# Patient Record
Sex: MALE | Race: WHITE | NOT HISPANIC OR LATINO | Employment: OTHER | URBAN - METROPOLITAN AREA
[De-identification: names, ages, dates, MRNs, and addresses within clinical notes are randomized per-mention and may not be internally consistent; named-entity substitution may affect disease eponyms.]

---

## 2020-10-14 ENCOUNTER — OFFICE VISIT (OUTPATIENT)
Dept: FAMILY MEDICINE CLINIC | Facility: CLINIC | Age: 75
End: 2020-10-14
Payer: MEDICARE

## 2020-10-14 VITALS
HEIGHT: 71 IN | TEMPERATURE: 97.8 F | OXYGEN SATURATION: 97 % | RESPIRATION RATE: 18 BRPM | SYSTOLIC BLOOD PRESSURE: 138 MMHG | BODY MASS INDEX: 32.2 KG/M2 | DIASTOLIC BLOOD PRESSURE: 80 MMHG | HEART RATE: 68 BPM | WEIGHT: 230 LBS

## 2020-10-14 DIAGNOSIS — R20.2 NUMBNESS AND TINGLING OF BOTH LEGS: ICD-10-CM

## 2020-10-14 DIAGNOSIS — R20.0 NUMBNESS AND TINGLING OF BOTH LEGS: ICD-10-CM

## 2020-10-14 DIAGNOSIS — IMO0002 DM (DIABETES MELLITUS) TYPE I UNCONTROLLED, PERIPH VASCULAR DISORDER: ICD-10-CM

## 2020-10-14 DIAGNOSIS — E78.2 HYPERLIPIDEMIA, MIXED: Primary | ICD-10-CM

## 2020-10-14 DIAGNOSIS — I25.10 CORONARY ATHEROSCLEROSIS DUE TO LIPID RICH PLAQUE: ICD-10-CM

## 2020-10-14 DIAGNOSIS — F41.9 ANXIETY: ICD-10-CM

## 2020-10-14 DIAGNOSIS — I25.83 CORONARY ATHEROSCLEROSIS DUE TO LIPID RICH PLAQUE: ICD-10-CM

## 2020-10-14 DIAGNOSIS — E05.00 GRAVES DISEASE: ICD-10-CM

## 2020-10-14 DIAGNOSIS — E03.8 HYPOTHYROIDISM DUE TO HASHIMOTO'S THYROIDITIS: ICD-10-CM

## 2020-10-14 DIAGNOSIS — E06.3 HYPOTHYROIDISM DUE TO HASHIMOTO'S THYROIDITIS: ICD-10-CM

## 2020-10-14 PROCEDURE — 99204 OFFICE O/P NEW MOD 45 MIN: CPT | Performed by: NURSE PRACTITIONER

## 2020-10-14 RX ORDER — PROPRANOLOL HYDROCHLORIDE 10 MG/1
10 TABLET ORAL 2 TIMES DAILY
Qty: 180 TABLET | Refills: 3 | Status: SHIPPED | OUTPATIENT
Start: 2020-10-14 | End: 2021-07-22

## 2020-10-14 RX ORDER — PRAVASTATIN SODIUM 40 MG
40 TABLET ORAL
COMMUNITY
Start: 2020-09-18 | End: 2020-10-14 | Stop reason: SDUPTHER

## 2020-10-14 RX ORDER — PRAVASTATIN SODIUM 40 MG
40 TABLET ORAL DAILY
Qty: 90 TABLET | Refills: 3 | Status: SHIPPED | OUTPATIENT
Start: 2020-10-14 | End: 2021-02-22 | Stop reason: HOSPADM

## 2020-10-14 RX ORDER — LEVOTHYROXINE SODIUM 0.15 MG/1
150 TABLET ORAL
COMMUNITY
Start: 2020-08-13 | End: 2020-10-14 | Stop reason: SDUPTHER

## 2020-10-14 RX ORDER — PRIMIDONE 250 MG/1
250 TABLET ORAL EVERY 8 HOURS SCHEDULED
Qty: 270 TABLET | Refills: 3 | Status: SHIPPED | OUTPATIENT
Start: 2020-10-14 | End: 2021-02-19

## 2020-10-14 RX ORDER — PROPRANOLOL HYDROCHLORIDE 10 MG/1
10 TABLET ORAL
COMMUNITY
Start: 2020-08-28 | End: 2020-10-14 | Stop reason: SDUPTHER

## 2020-10-14 RX ORDER — DULOXETIN HYDROCHLORIDE 60 MG/1
60 CAPSULE, DELAYED RELEASE ORAL
COMMUNITY
Start: 2020-10-08 | End: 2020-10-14 | Stop reason: SDUPTHER

## 2020-10-14 RX ORDER — PRIMIDONE 250 MG/1
250 TABLET ORAL
COMMUNITY
Start: 2020-10-08 | End: 2020-10-14 | Stop reason: SDUPTHER

## 2020-10-14 RX ORDER — DULOXETIN HYDROCHLORIDE 60 MG/1
60 CAPSULE, DELAYED RELEASE ORAL DAILY
Qty: 90 CAPSULE | Refills: 3 | Status: SHIPPED | OUTPATIENT
Start: 2020-10-14 | End: 2021-02-19

## 2020-10-14 RX ORDER — LEVOTHYROXINE SODIUM 0.15 MG/1
150 TABLET ORAL DAILY
Qty: 90 TABLET | Refills: 3 | Status: SHIPPED | OUTPATIENT
Start: 2020-10-14 | End: 2021-02-19

## 2020-10-14 RX ORDER — ALPRAZOLAM 0.25 MG/1
0.25 TABLET ORAL
COMMUNITY
Start: 2020-08-28

## 2020-10-17 LAB
ALBUMIN SERPL-MCNC: 4.6 G/DL (ref 3.7–4.7)
ALBUMIN/GLOB SERPL: 1.8 {RATIO} (ref 1.2–2.2)
ALP SERPL-CCNC: 70 IU/L (ref 39–117)
ALT SERPL-CCNC: 54 IU/L (ref 0–44)
AST SERPL-CCNC: 29 IU/L (ref 0–40)
BILIRUB SERPL-MCNC: 0.4 MG/DL (ref 0–1.2)
BUN SERPL-MCNC: 22 MG/DL (ref 8–27)
BUN/CREAT SERPL: 22 (ref 10–24)
CALCIUM SERPL-MCNC: 9.1 MG/DL (ref 8.6–10.2)
CHLORIDE SERPL-SCNC: 98 MMOL/L (ref 96–106)
CHOLEST SERPL-MCNC: 184 MG/DL (ref 100–199)
CO2 SERPL-SCNC: 25 MMOL/L (ref 20–29)
CREAT SERPL-MCNC: 1.01 MG/DL (ref 0.76–1.27)
GLOBULIN SER-MCNC: 2.5 G/DL (ref 1.5–4.5)
GLUCOSE SERPL-MCNC: 153 MG/DL (ref 65–99)
HDLC SERPL-MCNC: 37 MG/DL
LDLC SERPL CALC-MCNC: 113 MG/DL (ref 0–99)
POTASSIUM SERPL-SCNC: 5 MMOL/L (ref 3.5–5.2)
PROT SERPL-MCNC: 7.1 G/DL (ref 6–8.5)
SL AMB EGFR AFRICAN AMERICAN: 84 ML/MIN/1.73
SL AMB EGFR NON AFRICAN AMERICAN: 72 ML/MIN/1.73
SL AMB VLDL CHOLESTEROL CALC: 34 MG/DL (ref 5–40)
SODIUM SERPL-SCNC: 139 MMOL/L (ref 134–144)
T3FREE SERPL-MCNC: 3 PG/ML (ref 2–4.4)
T4 FREE SERPL-MCNC: 1.16 NG/DL (ref 0.82–1.77)
TRIGL SERPL-MCNC: 192 MG/DL (ref 0–149)
TSH SERPL DL<=0.005 MIU/L-ACNC: 3.07 UIU/ML (ref 0.45–4.5)

## 2021-02-19 ENCOUNTER — APPOINTMENT (EMERGENCY)
Dept: RADIOLOGY | Facility: HOSPITAL | Age: 76
DRG: 177 | End: 2021-02-19
Payer: MEDICARE

## 2021-02-19 ENCOUNTER — HOSPITAL ENCOUNTER (INPATIENT)
Facility: HOSPITAL | Age: 76
LOS: 2 days | Discharge: HOME | DRG: 177 | End: 2021-02-22
Attending: EMERGENCY MEDICINE | Admitting: INTERNAL MEDICINE
Payer: MEDICARE

## 2021-02-19 DIAGNOSIS — R74.01 TRANSAMINITIS: ICD-10-CM

## 2021-02-19 DIAGNOSIS — U07.1 PNEUMONIA DUE TO COVID-19 VIRUS: ICD-10-CM

## 2021-02-19 DIAGNOSIS — J06.9 URI (UPPER RESPIRATORY INFECTION): ICD-10-CM

## 2021-02-19 DIAGNOSIS — J12.82 PNEUMONIA DUE TO COVID-19 VIRUS: ICD-10-CM

## 2021-02-19 DIAGNOSIS — U07.1 COVID-19: Primary | ICD-10-CM

## 2021-02-19 LAB
FLUAV RNA RESP QL NAA+PROBE: NEGATIVE
FLUBV RNA RESP QL NAA+PROBE: NEGATIVE
RSV RNA RESP QL NAA+PROBE: NEGATIVE
SARS-COV-2 RNA RESP QL NAA+PROBE: POSITIVE

## 2021-02-19 PROCEDURE — 0241U HB NFCT DS VIR RESP RNA 4 TRGT: CPT | Performed by: EMERGENCY MEDICINE

## 2021-02-19 PROCEDURE — 99285 EMERGENCY DEPT VISIT HI MDM: CPT | Performed by: EMERGENCY MEDICINE

## 2021-02-19 PROCEDURE — 71045 X-RAY EXAM CHEST 1 VIEW: CPT

## 2021-02-19 PROCEDURE — 99285 EMERGENCY DEPT VISIT HI MDM: CPT

## 2021-02-19 PROCEDURE — 1124F ACP DISCUSS-NO DSCNMKR DOCD: CPT | Performed by: EMERGENCY MEDICINE

## 2021-02-19 RX ORDER — GLYBURIDE-METFORMIN HYDROCHLORIDE 2.5; 5 MG/1; MG/1
2 TABLET ORAL
COMMUNITY
Start: 2020-11-04

## 2021-02-19 RX ORDER — METHYLPREDNISOLONE 4 MG/1
TABLET ORAL
Qty: 21 TABLET | Refills: 0 | Status: SHIPPED | OUTPATIENT
Start: 2021-02-19

## 2021-02-19 RX ORDER — DOXYCYCLINE HYCLATE 100 MG/1
100 CAPSULE ORAL 2 TIMES DAILY
Qty: 20 CAPSULE | Refills: 0 | Status: SHIPPED | OUTPATIENT
Start: 2021-02-19 | End: 2021-03-01

## 2021-02-19 RX ORDER — DULOXETIN HYDROCHLORIDE 60 MG/1
60 CAPSULE, DELAYED RELEASE ORAL DAILY
COMMUNITY

## 2021-02-19 RX ORDER — LEVOTHYROXINE SODIUM 0.15 MG/1
150 TABLET ORAL
COMMUNITY
Start: 2020-11-04

## 2021-02-19 RX ORDER — ALBUTEROL SULFATE 90 UG/1
2 AEROSOL, METERED RESPIRATORY (INHALATION) EVERY 4 HOURS PRN
Qty: 1 INHALER | Refills: 0 | Status: SHIPPED | OUTPATIENT
Start: 2021-02-19

## 2021-02-20 ENCOUNTER — APPOINTMENT (EMERGENCY)
Dept: CT IMAGING | Facility: HOSPITAL | Age: 76
DRG: 177 | End: 2021-02-20
Payer: MEDICARE

## 2021-02-20 PROBLEM — U07.1 PNEUMONIA DUE TO COVID-19 VIRUS: Status: ACTIVE | Noted: 2021-02-20

## 2021-02-20 PROBLEM — J12.82 PNEUMONIA DUE TO COVID-19 VIRUS: Status: ACTIVE | Noted: 2021-02-20

## 2021-02-20 PROBLEM — R74.01 TRANSAMINITIS: Status: ACTIVE | Noted: 2021-02-20

## 2021-02-20 PROBLEM — E11.9 TYPE 2 DIABETES MELLITUS, WITHOUT LONG-TERM CURRENT USE OF INSULIN (HCC): Status: ACTIVE | Noted: 2020-10-14

## 2021-02-20 LAB
ABO GROUP BLD: NORMAL
ALBUMIN SERPL BCP-MCNC: 3.8 G/DL (ref 3.5–5)
ALP SERPL-CCNC: 144 U/L (ref 46–116)
ALT SERPL W P-5'-P-CCNC: 200 U/L (ref 12–78)
ANION GAP SERPL CALCULATED.3IONS-SCNC: 9 MMOL/L (ref 4–13)
AST SERPL W P-5'-P-CCNC: 97 U/L (ref 5–45)
ATRIAL RATE: 97 BPM
BASOPHILS # BLD AUTO: 0.02 THOUSANDS/ΜL (ref 0–0.1)
BASOPHILS NFR BLD AUTO: 0 % (ref 0–1)
BILIRUB SERPL-MCNC: 0.5 MG/DL (ref 0.2–1)
BUN SERPL-MCNC: 15 MG/DL (ref 5–25)
CALCIUM SERPL-MCNC: 9 MG/DL (ref 8.3–10.1)
CHLORIDE SERPL-SCNC: 100 MMOL/L (ref 100–108)
CO2 SERPL-SCNC: 28 MMOL/L (ref 21–32)
CREAT SERPL-MCNC: 1.02 MG/DL (ref 0.6–1.3)
CRP SERPL QL: 18.1 MG/L
D DIMER PPP FEU-MCNC: <0.27 UG/ML FEU
EOSINOPHIL # BLD AUTO: 0.01 THOUSAND/ΜL (ref 0–0.61)
EOSINOPHIL NFR BLD AUTO: 0 % (ref 0–6)
ERYTHROCYTE [DISTWIDTH] IN BLOOD BY AUTOMATED COUNT: 12.8 % (ref 11.6–15.1)
EST. AVERAGE GLUCOSE BLD GHB EST-MCNC: 126 MG/DL
FERRITIN SERPL-MCNC: 203 NG/ML (ref 8–388)
GFR SERPL CREATININE-BSD FRML MDRD: 72 ML/MIN/1.73SQ M
GLUCOSE SERPL-MCNC: 120 MG/DL (ref 65–140)
GLUCOSE SERPL-MCNC: 120 MG/DL (ref 65–140)
GLUCOSE SERPL-MCNC: 132 MG/DL (ref 65–140)
GLUCOSE SERPL-MCNC: 161 MG/DL (ref 65–140)
GLUCOSE SERPL-MCNC: 98 MG/DL (ref 65–140)
HBA1C MFR BLD: 6 %
HCT VFR BLD AUTO: 45.6 % (ref 36.5–49.3)
HGB BLD-MCNC: 15.4 G/DL (ref 12–17)
IMM GRANULOCYTES # BLD AUTO: 0.03 THOUSAND/UL (ref 0–0.2)
IMM GRANULOCYTES NFR BLD AUTO: 1 % (ref 0–2)
LACTATE SERPL-SCNC: 0.9 MMOL/L (ref 0.5–2)
LYMPHOCYTES # BLD AUTO: 1.5 THOUSANDS/ΜL (ref 0.6–4.47)
LYMPHOCYTES NFR BLD AUTO: 25 % (ref 14–44)
MCH RBC QN AUTO: 31.7 PG (ref 26.8–34.3)
MCHC RBC AUTO-ENTMCNC: 33.8 G/DL (ref 31.4–37.4)
MCV RBC AUTO: 94 FL (ref 82–98)
MONOCYTES # BLD AUTO: 0.63 THOUSAND/ΜL (ref 0.17–1.22)
MONOCYTES NFR BLD AUTO: 11 % (ref 4–12)
NEUTROPHILS # BLD AUTO: 3.76 THOUSANDS/ΜL (ref 1.85–7.62)
NEUTS SEG NFR BLD AUTO: 63 % (ref 43–75)
NRBC BLD AUTO-RTO: 0 /100 WBCS
NT-PROBNP SERPL-MCNC: 54 PG/ML
P AXIS: 46 DEGREES
PLATELET # BLD AUTO: 234 THOUSANDS/UL (ref 149–390)
PMV BLD AUTO: 8.9 FL (ref 8.9–12.7)
POTASSIUM SERPL-SCNC: 4.5 MMOL/L (ref 3.5–5.3)
PR INTERVAL: 206 MS
PROCALCITONIN SERPL-MCNC: 0.06 NG/ML
PROT SERPL-MCNC: 8.1 G/DL (ref 6.4–8.2)
QRS AXIS: -9 DEGREES
QRSD INTERVAL: 76 MS
QT INTERVAL: 352 MS
QTC INTERVAL: 447 MS
RBC # BLD AUTO: 4.86 MILLION/UL (ref 3.88–5.62)
RH BLD: POSITIVE
SODIUM SERPL-SCNC: 137 MMOL/L (ref 136–145)
T WAVE AXIS: 38 DEGREES
TROPONIN I SERPL-MCNC: <0.02 NG/ML
VENTRICULAR RATE: 97 BPM
WBC # BLD AUTO: 5.95 THOUSAND/UL (ref 4.31–10.16)

## 2021-02-20 PROCEDURE — 87040 BLOOD CULTURE FOR BACTERIA: CPT | Performed by: PHYSICIAN ASSISTANT

## 2021-02-20 PROCEDURE — 36415 COLL VENOUS BLD VENIPUNCTURE: CPT | Performed by: EMERGENCY MEDICINE

## 2021-02-20 PROCEDURE — 93010 ELECTROCARDIOGRAM REPORT: CPT | Performed by: INTERNAL MEDICINE

## 2021-02-20 PROCEDURE — 85379 FIBRIN DEGRADATION QUANT: CPT | Performed by: EMERGENCY MEDICINE

## 2021-02-20 PROCEDURE — 84484 ASSAY OF TROPONIN QUANT: CPT | Performed by: EMERGENCY MEDICINE

## 2021-02-20 PROCEDURE — 71275 CT ANGIOGRAPHY CHEST: CPT

## 2021-02-20 PROCEDURE — 86140 C-REACTIVE PROTEIN: CPT | Performed by: PHYSICIAN ASSISTANT

## 2021-02-20 PROCEDURE — 86900 BLOOD TYPING SEROLOGIC ABO: CPT | Performed by: PHYSICIAN ASSISTANT

## 2021-02-20 PROCEDURE — 84145 PROCALCITONIN (PCT): CPT | Performed by: PHYSICIAN ASSISTANT

## 2021-02-20 PROCEDURE — 83605 ASSAY OF LACTIC ACID: CPT | Performed by: PHYSICIAN ASSISTANT

## 2021-02-20 PROCEDURE — 93005 ELECTROCARDIOGRAM TRACING: CPT

## 2021-02-20 PROCEDURE — 83036 HEMOGLOBIN GLYCOSYLATED A1C: CPT | Performed by: PHYSICIAN ASSISTANT

## 2021-02-20 PROCEDURE — 80053 COMPREHEN METABOLIC PANEL: CPT | Performed by: EMERGENCY MEDICINE

## 2021-02-20 PROCEDURE — XW033E5 INTRODUCTION OF REMDESIVIR ANTI-INFECTIVE INTO PERIPHERAL VEIN, PERCUTANEOUS APPROACH, NEW TECHNOLOGY GROUP 5: ICD-10-PCS | Performed by: INTERNAL MEDICINE

## 2021-02-20 PROCEDURE — 82728 ASSAY OF FERRITIN: CPT | Performed by: PHYSICIAN ASSISTANT

## 2021-02-20 PROCEDURE — 86901 BLOOD TYPING SEROLOGIC RH(D): CPT | Performed by: PHYSICIAN ASSISTANT

## 2021-02-20 PROCEDURE — 85025 COMPLETE CBC W/AUTO DIFF WBC: CPT | Performed by: EMERGENCY MEDICINE

## 2021-02-20 PROCEDURE — 83880 ASSAY OF NATRIURETIC PEPTIDE: CPT | Performed by: PHYSICIAN ASSISTANT

## 2021-02-20 PROCEDURE — 82948 REAGENT STRIP/BLOOD GLUCOSE: CPT

## 2021-02-20 PROCEDURE — 99223 1ST HOSP IP/OBS HIGH 75: CPT | Performed by: PHYSICIAN ASSISTANT

## 2021-02-20 RX ORDER — DOCUSATE SODIUM 100 MG/1
100 CAPSULE, LIQUID FILLED ORAL 2 TIMES DAILY
Status: DISCONTINUED | OUTPATIENT
Start: 2021-02-20 | End: 2021-02-22 | Stop reason: HOSPADM

## 2021-02-20 RX ORDER — MELATONIN
2000 DAILY
Status: DISCONTINUED | OUTPATIENT
Start: 2021-02-20 | End: 2021-02-22 | Stop reason: HOSPADM

## 2021-02-20 RX ORDER — PROPRANOLOL HYDROCHLORIDE 20 MG/1
10 TABLET ORAL 2 TIMES DAILY
Status: DISCONTINUED | OUTPATIENT
Start: 2021-02-20 | End: 2021-02-22 | Stop reason: HOSPADM

## 2021-02-20 RX ORDER — DULOXETIN HYDROCHLORIDE 30 MG/1
60 CAPSULE, DELAYED RELEASE ORAL DAILY
Status: DISCONTINUED | OUTPATIENT
Start: 2021-02-20 | End: 2021-02-22 | Stop reason: HOSPADM

## 2021-02-20 RX ORDER — MULTIVITAMIN/IRON/FOLIC ACID 18MG-0.4MG
1 TABLET ORAL DAILY
Status: DISCONTINUED | OUTPATIENT
Start: 2021-02-27 | End: 2021-02-22 | Stop reason: HOSPADM

## 2021-02-20 RX ORDER — ASCORBIC ACID 500 MG
1000 TABLET ORAL EVERY 12 HOURS SCHEDULED
Status: DISCONTINUED | OUTPATIENT
Start: 2021-02-20 | End: 2021-02-22 | Stop reason: HOSPADM

## 2021-02-20 RX ORDER — ZINC SULFATE 50(220)MG
220 CAPSULE ORAL DAILY
Status: DISCONTINUED | OUTPATIENT
Start: 2021-02-20 | End: 2021-02-22 | Stop reason: HOSPADM

## 2021-02-20 RX ORDER — ONDANSETRON 2 MG/ML
4 INJECTION INTRAMUSCULAR; INTRAVENOUS EVERY 6 HOURS PRN
Status: DISCONTINUED | OUTPATIENT
Start: 2021-02-20 | End: 2021-02-22 | Stop reason: HOSPADM

## 2021-02-20 RX ORDER — PRAVASTATIN SODIUM 40 MG
40 TABLET ORAL DAILY
Status: DISCONTINUED | OUTPATIENT
Start: 2021-02-20 | End: 2021-02-22 | Stop reason: HOSPADM

## 2021-02-20 RX ORDER — ACETAMINOPHEN 325 MG/1
650 TABLET ORAL ONCE
Status: COMPLETED | OUTPATIENT
Start: 2021-02-20 | End: 2021-02-20

## 2021-02-20 RX ORDER — LEVOTHYROXINE SODIUM 0.15 MG/1
150 TABLET ORAL
Status: DISCONTINUED | OUTPATIENT
Start: 2021-02-20 | End: 2021-02-22 | Stop reason: HOSPADM

## 2021-02-20 RX ORDER — ACETAMINOPHEN 325 MG/1
650 TABLET ORAL EVERY 6 HOURS PRN
Status: DISCONTINUED | OUTPATIENT
Start: 2021-02-20 | End: 2021-02-22 | Stop reason: HOSPADM

## 2021-02-20 RX ADMIN — PROPRANOLOL HYDROCHLORIDE 10 MG: 20 TABLET ORAL at 08:40

## 2021-02-20 RX ADMIN — Medication 2000 UNITS: at 08:40

## 2021-02-20 RX ADMIN — DULOXETINE HYDROCHLORIDE 60 MG: 30 CAPSULE, DELAYED RELEASE ORAL at 08:39

## 2021-02-20 RX ADMIN — OXYCODONE HYDROCHLORIDE AND ACETAMINOPHEN 1000 MG: 500 TABLET ORAL at 08:41

## 2021-02-20 RX ADMIN — ACETAMINOPHEN 650 MG: 325 TABLET ORAL at 00:18

## 2021-02-20 RX ADMIN — INSULIN LISPRO 1 UNITS: 100 INJECTION, SOLUTION INTRAVENOUS; SUBCUTANEOUS at 17:00

## 2021-02-20 RX ADMIN — ENOXAPARIN SODIUM 30 MG: 30 INJECTION SUBCUTANEOUS at 21:22

## 2021-02-20 RX ADMIN — OXYCODONE HYDROCHLORIDE AND ACETAMINOPHEN 1000 MG: 500 TABLET ORAL at 21:21

## 2021-02-20 RX ADMIN — DOCUSATE SODIUM 100 MG: 100 CAPSULE, LIQUID FILLED ORAL at 08:41

## 2021-02-20 RX ADMIN — IOHEXOL 95 ML: 350 INJECTION, SOLUTION INTRAVENOUS at 01:29

## 2021-02-20 RX ADMIN — REMDESIVIR 200 MG: 100 INJECTION, POWDER, LYOPHILIZED, FOR SOLUTION INTRAVENOUS at 06:03

## 2021-02-20 RX ADMIN — PRAVASTATIN SODIUM 40 MG: 40 TABLET ORAL at 17:14

## 2021-02-20 RX ADMIN — ENOXAPARIN SODIUM 30 MG: 30 INJECTION SUBCUTANEOUS at 08:39

## 2021-02-20 RX ADMIN — PROPRANOLOL HYDROCHLORIDE 10 MG: 20 TABLET ORAL at 17:15

## 2021-02-20 RX ADMIN — LEVOTHYROXINE SODIUM 150 MCG: 150 TABLET ORAL at 06:01

## 2021-02-20 RX ADMIN — ZINC SULFATE 220 MG (50 MG) CAPSULE 220 MG: CAPSULE at 08:40

## 2021-02-20 NOTE — ASSESSMENT & PLAN NOTE
Patient presents with fatigue, fevers, and diarrhea x1 week  Daughter tested positive for COVID  · Tested positive on 02/19/2021  Mild COVID pathway as below   On my examination patient was saturating at 97% on 0 5 L via nasal cannula  Wean as tolerated for oxygen saturation greater than 92%  · CTA showing No evidence of acute pulmonary embolus  Multifocal areas of groundglass and consolidative opacity in the left lingula and lower lobe which may reflect viral pneumonia in the setting of Covid 19    CBC and CMP daily  Cardiac and inflammatory markers pending  DVT prophylaxis per COVID protocol  Will hold off on dexamethasone as patient is not requiring oxygen at this time  Remdesivir 200mg IV day 1 and 100mg IV daily X4 days   Consideration for convalescent plasma therapy   OOB TID; ambulation and mobilization strongly encouraged  Self proning strongly encouraged  Supportive care

## 2021-02-20 NOTE — PLAN OF CARE
Problem: Potential for Falls  Goal: Patient will remain free of falls  Description: INTERVENTIONS:  - Assess patient frequently for physical needs  -  Identify cognitive and physical deficits and behaviors that affect risk of falls    -  [place] fall precautions as indicated by assessment   - Educate patient/family on patient safety including physical limitations  - Instruct patient to call for assistance with activity based on assessment  - Modify environment to reduce risk of injury  - Consider OT/PT consult to assist with strengthening/mobility  Outcome: Progressing     Problem: PAIN - ADULT  Goal: Verbalizes/displays adequate comfort level or baseline comfort level  Description: Interventions:  - Encourage patient to monitor pain and request assistance  - Assess pain using appropriate pain scale  - Administer analgesics based on type and severity of pain and evaluate response  - Implement non-pharmacological measures as appropriate and evaluate response  - Consider cultural and social influences on pain and pain management  - Notify physician/advanced practitioner if interventions unsuccessful or patient reports new pain  Outcome: Progressing     Problem: INFECTION - ADULT  Goal: Absence or prevention of progression during hospitalization  Description: INTERVENTIONS:  - Assess and monitor for signs and symptoms of infection  - Monitor lab/diagnostic results  - Monitor all insertion sites, i e  indwelling lines, tubes, and drains  - Monitor endotracheal if appropriate and nasal secretions for changes in amount and color  - [place] appropriate cooling/warming therapies per order  - Administer medications as ordered  - Instruct and encourage patient and family to use good hand hygiene technique  - Identify and instruct in appropriate isolation precautions for identified infection/condition  Outcome: Progressing  Goal: Absence of fever/infection during neutropenic period  Description: INTERVENTIONS:  - Monitor WBC    Outcome: Progressing     Problem: SAFETY ADULT  Goal: Maintain or return to baseline ADL function  Description: INTERVENTIONS:  -  Assess patient's ability to carry out ADLs; assess patient's baseline for ADL function and identify physical deficits which impact ability to perform ADLs (bathing, care of mouth/teeth, toileting, grooming, dressing, etc )  - Assess/evaluate cause of self-care deficits   - Assess range of motion  - Assess patient's mobility; develop plan if impaired  - Assess patient's need for assistive devices and provide as appropriate  - Encourage maximum independence but intervene and supervise when necessary  - Involve family in performance of ADLs  - Assess for home care needs following discharge   - Consider OT consult to assist with ADL evaluation and planning for discharge  - Provide patient education as appropriate  Outcome: Progressing  Goal: Maintain or return mobility status to optimal level  Description: INTERVENTIONS:  - Assess patient's baseline mobility status (ambulation, transfers, stairs, etc )    - Identify cognitive and physical deficits and behaviors that affect mobility  - Identify mobility aids required to assist with transfers and/or ambulation (gait belt, sit-to-stand, lift, walker, cane, etc )  - [place] fall precautions as indicated by assessment  - Record patient progress and toleration of activity level on Mobility SBAR; progress patient to next Phase/Stage  - Instruct patient to call for assistance with activity based on assessment  - Consider rehabilitation consult to assist with strengthening/weightbearing, etc   Outcome: Progressing     Problem: DISCHARGE PLANNING  Goal: Discharge to home or other facility with appropriate resources  Description: INTERVENTIONS:  - Identify barriers to discharge w/patient and caregiver  - Arrange for needed discharge resources and transportation as appropriate  - Identify discharge learning needs (meds, wound care, etc )  - Arrange for interpretive services to assist at discharge as needed  - Refer to Case Management Department for coordinating discharge planning if the patient needs post-hospital services based on physician/advanced practitioner order or complex needs related to functional status, cognitive ability, or social support system  Outcome: Progressing     Problem: Knowledge Deficit  Goal: Patient/family/caregiver demonstrates understanding of disease process, treatment plan, medications, and discharge instructions  Description: Complete learning assessment and assess knowledge base    Interventions:  - Provide teaching at level of understanding  - Provide teaching via preferred learning methods  Outcome: Progressing     Problem: RESPIRATORY - ADULT  Goal: Achieves optimal ventilation and oxygenation  Description: INTERVENTIONS:  - Assess for changes in respiratory status  - Assess for changes in mentation and behavior  - Position to facilitate oxygenation and minimize respiratory effort  - Oxygen administered by appropriate delivery if ordered  - Initiate smoking cessation education as indicated  - Encourage broncho-pulmonary hygiene including cough, deep breathe, Incentive Spirometry  - Assess the need for suctioning and aspirate as needed  - Assess and instruct to report SOB or any respiratory difficulty  - Respiratory Therapy support as indicated  Outcome: Progressing

## 2021-02-20 NOTE — ASSESSMENT & PLAN NOTE
No results found for: HGBA1C    No results for input(s): POCGLU in the last 72 hours      Blood Sugar Average: Last 72 hrs:  ·  hemoglobin A1c pending  · Hold metformin while inpatient  · Sliding scale insulin  · Hypoglycemic protocol, carb controlled diet

## 2021-02-20 NOTE — ED PROVIDER NOTES
History  Chief Complaint   Patient presents with    Flu Symptoms     Pr reports fever, chills, fatigue, and loose stool x 1 week  Daughter covid positive  43-year-old male presents with concern for COVID infection  His daughter is COVID positive, as well as grandson with whom he interacts regularly  His states that over the past week he has had fever, chills, extreme fatigue, dyspnea on exertion, and loose stool  He denies chest pain, denies nausea vomiting  Admits to low appetite  Used a pulse oximeter at home and had a resting pulse ox of 88%  Here a resting pulse ox of 89% was documented  He also has a persistent resting tachycardia in the 110's, which is abnormal for him  Comorbid conditions are concerning for diabetes, hyperlipidemia, obesity, and autoimmune disease  Prior to Admission Medications   Prescriptions Last Dose Informant Patient Reported? Taking?    ALPRAZolam (XANAX) 0 25 mg tablet   Yes Yes   Sig: Take 0 25 mg by mouth   DULoxetine (CYMBALTA) 60 mg delayed release capsule   Yes Yes   Sig: Take 60 mg by mouth daily   glyBURIDE-metFORMIN (GLUCOVANCE) 2 5-500 MG per tablet   Yes Yes   Sig: Take 2 tablets by mouth   levothyroxine (Synthroid) 150 mcg tablet   Yes Yes   Sig: Take 150 mcg by mouth   pravastatin (PRAVACHOL) 40 mg tablet   No Yes   Sig: Take 1 tablet (40 mg total) by mouth daily   primidone (MYSOLINE) 250 mg tablet   No Yes   Sig: Take 1 tablet (250 mg total) by mouth every 8 (eight) hours   propranolol (INDERAL) 10 mg tablet   No No   Sig: Take 1 tablet (10 mg total) by mouth 2 (two) times a day      Facility-Administered Medications: None       Past Medical History:   Diagnosis Date    Anxiety     Coronary artery disease     Depression     [facility] mellitus ([facility] [address] )     Graves disease     Hyperlipidemia     Hypothyroidism     Myopathy     Neuropathy        Past Surgical History:   Procedure Laterality Date    CORONARY ANGIOPLASTY WITH STENT PLACEMENT Family History   Problem Relation Age of Onset    Cancer Mother     Breast cancer Mother     Heart failure Father      I have reviewed and agree with the history as documented  E-Cigarette/Vaping    E-Cigarette Use Never User      E-Cigarette/Vaping Substances    Nicotine No     THC No     CBD No     Flavoring No     Other No     Unknown No      Social History     Tobacco Use    Smoking status: Former Smoker     Packs/day: 0 25     Years: 5 00     Pack years: 1 25     Types: Cigarettes    Smokeless tobacco: Never Used   Substance Use Topics    Alcohol use: Never     Frequency: Never    Drug use: Never       Review of Systems   Constitutional: Positive for appetite change, chills, fatigue and fever  HENT: Positive for sore throat  Negative for congestion  Respiratory: Positive for shortness of breath  Negative for apnea, cough, chest tightness and wheezing  Cardiovascular: Negative for chest pain, palpitations and leg swelling  Gastrointestinal: Positive for diarrhea  Negative for abdominal pain, constipation, nausea and vomiting  Genitourinary: Negative for dysuria and flank pain  Musculoskeletal: Negative for back pain, neck pain and neck stiffness  Skin: Negative for color change and rash  Allergic/Immunologic: Negative for immunocompromised state  Neurological: Positive for weakness (Generalized)  Negative for dizziness, syncope, light-headedness, numbness and headaches  Psychiatric/Behavioral: Negative for confusion  Physical Exam  Physical Exam  Vitals signs reviewed  Constitutional:       General: He is not in acute distress  Appearance: He is well-developed  He is not ill-appearing, toxic-appearing or diaphoretic  Comments: Appears fatigued   HENT:      Head: Normocephalic and atraumatic  Mouth/Throat:      Comments: ENT exam deferred due to COVID concerns  Eyes:      General: No scleral icterus  Right eye: No discharge           Left eye: No discharge  Conjunctiva/sclera: Conjunctivae normal       Pupils: Pupils are equal, round, and reactive to light  Neck:      Musculoskeletal: Normal range of motion and neck supple  No neck rigidity  Vascular: No JVD  Cardiovascular:      Rate and Rhythm: Regular rhythm  Tachycardia present  Heart sounds: Normal heart sounds  No murmur  No friction rub  No gallop  Pulmonary:      Effort: Pulmonary effort is normal  No respiratory distress  Breath sounds: Normal breath sounds  No wheezing, rhonchi or rales  Chest:      Chest wall: No tenderness  Abdominal:      General: Bowel sounds are normal  There is no distension  Palpations: Abdomen is soft  Tenderness: There is no abdominal tenderness  There is no right CVA tenderness, left CVA tenderness, guarding or rebound  Musculoskeletal: Normal range of motion  General: No tenderness or deformity  Right lower leg: No edema  Left lower leg: No edema  Skin:     General: Skin is warm and dry  Coloration: Skin is not pale  Findings: No erythema or rash  Neurological:      Mental Status: He is alert and oriented to person, place, and time  Cranial Nerves: No cranial nerve deficit     Psychiatric:         Behavior: Behavior normal          Vital Signs  ED Triage Vitals   Temperature Pulse Respirations Blood Pressure SpO2   02/19/21 2126 02/19/21 2126 02/19/21 2126 02/19/21 2126 02/19/21 2126   99 1 °F (37 3 °C) (!) 109 18 156/86 95 %      Temp Source Heart Rate Source Patient Position - Orthostatic VS BP Location FiO2 (%)   02/19/21 2126 02/19/21 2126 -- 02/19/21 2126 --   Oral Monitor  Right arm       Pain Score       02/20/21 0018       Med Not Given for Pain - for MAR use only           Vitals:    02/19/21 2126 02/20/21 0017   BP: 156/86    Pulse: (!) 109 (!) 114         Visual Acuity      ED Medications  Medications   acetaminophen (TYLENOL) tablet 650 mg (650 mg Oral Given 2/20/21 0018) iohexol (OMNIPAQUE) 350 MG/ML injection (MULTI-DOSE) 95 mL (95 mL Intravenous Given 2/20/21 0129)       Diagnostic Studies  Results Reviewed     Procedure Component Value Units Date/Time    Comprehensive metabolic panel [[id number]]  (Abnormal) Collected: 02/20/21 0052    Lab Status: Final result Specimen: Blood from Arm, Right Updated: 02/20/21 0125     Sodium 137 mmol/L      Potassium 4 5 mmol/L      Chloride 100 mmol/L      CO2 28 mmol/L      ANION GAP 9 mmol/L      BUN 15 mg/dL      Creatinine 1 02 mg/dL      Glucose 120 mg/dL      Calcium 9 0 mg/dL      AST 97 U/L       U/L      Alkaline Phosphatase 144 U/L      Total Protein 8 1 g/dL      Albumin 3 8 g/dL      Total Bilirubin 0 50 mg/dL      eGFR 72 ml/min/1 73sq m     Narrative:      [place] guidelines for Chronic Kidney Disease (CKD):     Stage 1 with normal or high GFR (GFR > 90 mL/min/1 73 square meters)    Stage 2 Mild CKD (GFR = 60-89 mL/min/1 73 square meters)    Stage 3A Moderate CKD (GFR = 45-59 mL/min/1 73 square meters)    Stage 3B Moderate CKD (GFR = 30-44 mL/min/1 73 square meters)    Stage 4 Severe CKD (GFR = 15-29 mL/min/1 73 square meters)    Stage 5 End Stage CKD (GFR <15 mL/min/1 73 square meters)  Note: GFR calculation is accurate only with a steady state creatinine    D-dimer, quantitative [[id number]]  (Normal) Collected: 02/20/21 0052    Lab Status: Final result Specimen: Blood from Arm, Right Updated: 02/20/21 0123     D-Dimer, Quant <0 27 ug/ml FEU     Troponin I [[id number]]  (Normal) Collected: 02/20/21 0052    Lab Status: Final result Specimen: Blood from Arm, Right Updated: 02/20/21 0120     Troponin I <0 02 ng/mL     CBC and differential [[id number]] Collected: 02/20/21 0052    Lab Status: Final result Specimen: Blood from Arm, Right Updated: 02/20/21 0101     WBC 5 95 Thousand/uL      RBC 4 86 Million/uL      Hemoglobin 15 4 g/dL      Hematocrit 45 6 %      MCV 94 fL      MCH 31 7 pg      MCHC 33 8 g/dL      RDW 12 8 %      MPV 8 9 fL      Platelets 511 Thousands/uL      nRBC 0 /100 WBCs      Neutrophils Relative 63 %      Immat GRANS % 1 %      Lymphocytes Relative 25 %      Monocytes Relative 11 %      Eosinophils Relative 0 %      Basophils Relative 0 %      Neutrophils Absolute 3 76 Thousands/µL      Immature Grans Absolute 0 03 Thousand/uL      Lymphocytes Absolute 1 50 Thousands/µL      Monocytes Absolute 0 63 Thousand/µL      Eosinophils Absolute 0 01 Thousand/µL      Basophils Absolute 0 02 Thousands/µL     COVID19, Influenza A/B, RSV PCR, [facility] [[id number]]  (Abnormal) Collected: 02/19/21 2152    Lab Status: Final result Specimen: Nasopharyngeal Swab Updated: 02/19/21 2242     SARS-CoV-2 Positive     INFLUENZA A PCR Negative     INFLUENZA B PCR Negative     RSV PCR Negative    Narrative: This test has been authorized by FDA under an EUA (Emergency Use Assay) for use by authorized laboratories  Clinical caution and judgement should be used with the interpretation of these results with consideration of the clinical impression and other laboratory testing  Testing reported as "Positive" or "Negative" has been proven to be accurate according to standard laboratory validation requirements  All testing is performed with control materials showing appropriate reactivity at standard intervals  CTA ED chest PE Study   ED Interpretation by Radu Garcia DO (02/20 0158)      No evidence of acute pulmonary embolus      Multifocal areas of groundglass and consolidative opacity in the left lingula and lower lobe which may reflect viral pneumonia in the setting of Covid 19  Final Result by Ivis Mejia MD (02/20 0149)      No evidence of acute pulmonary embolus  Multifocal areas of groundglass and consolidative opacity in the left lingula and lower lobe which may reflect viral pneumonia in the setting of Covid 19              Workstation performed: [id number] XR chest portable    (Results Pending)              Procedures  ECG 12 Lead Documentation Only    Date/Time: 2/20/2021 2:26 AM  Performed by: Rebekah Garcia DO  Authorized by: Rebekah Garcia DO     Indications / Diagnosis:  COVID pneumonia  ECG reviewed by me, the ED Provider: yes    Patient location:  ED  Previous ECG:     Previous ECG:  Unavailable    Comparison to cardiac monitor: Yes    Interpretation:     Interpretation: normal    Rate:     ECG rate:  97    ECG rate assessment: normal    Rhythm:     Rhythm: sinus rhythm    Ectopy:     Ectopy: none    QRS:     QRS axis:  Normal    QRS intervals:  Normal  Conduction:     Conduction: normal    ST segments:     ST segments:  Normal  T waves:     T waves: normal               ED Course  ED Course as of Feb 20 0237   Fri Feb 19, 2021   2243 SARS-COV-2(!): Positive   2306 Consented for [place] device  Sat Feb 20, 2021   0030 Pulse ox dips to 89% at rest   Pt persistantly tachycardic  Patient will be admitted  Basic labs  Consider CTA PE      0032 SpO2(!): 89 %   0202 TT [facility] to [facility]                                 SBIRT 22yo+      Most Recent Value   SBIRT (23 yo +)   In order to provide better care to our patients, we are screening all of our patients for alcohol and drug use  Would it be okay to ask you these screening questions? Yes Filed at: 02/19/2021 2128   Initial Alcohol Screen: US AUDIT-C    1  How often do you have a drink containing alcohol?  0 Filed at: 02/19/2021 2128   2  How many drinks containing alcohol do you have on a typical day you are drinking? 0 Filed at: 02/19/2021 2128   3a  Male UNDER 65: How often do you have five or more drinks on one occasion? 0 Filed at: 02/19/2021 2128   3b  FEMALE Any Age, or MALE 65+: How often do you have 4 or more drinks on one occassion? 0 Filed at: 02/19/2021 2128   Audit-C Score  0 Filed at: 02/19/2021 2128   DIDIER: How many times in the past year have you       Used an illegal drug or used a prescription medication for non-medical reasons? Never Filed at: 02/19/2021 2128          Wells' Criteria for PE      Most Recent Value   Wells' Criteria for PE   Clinical signs and symptoms of DVT  0 Filed at: 02/20/2021 8624   PE is primary diagnosis or equally likely  0 Filed at: 02/20/2021 0032   HR >100  1 5 Filed at: 02/20/2021 0032   Immobilization at least 3 days or Surgery in the previous 4 weeks  0 Filed at: 02/20/2021 0032   Previous, objectively diagnosed PE or DVT  0 Filed at: 02/20/2021 0032   Hemoptysis  0 Filed at: 02/20/2021 0032   Malignancy with treatment within 6 months or palliative  0 Filed at: 02/20/2021 6382   Wells' Criteria Total  1 5 Filed at: 02/20/2021 0032                MDM  Number of Diagnoses or Management Options  COVID-19:   URI (upper respiratory infection):   Diagnosis management comments: COVID-19 infection   low pulse ox  COVID pneumonia  Patient requires admission  Amount and/or Complexity of Data Reviewed  Clinical lab tests: ordered and reviewed  Tests in the radiology section of CPT®: ordered and reviewed        Disposition  Final diagnoses:   COVID-19   URI (upper respiratory infection)   Pneumonia due to COVID-19 virus     Time reflects when diagnosis was documented in both MDM as applicable and the Disposition within this note     Time User Action Codes Description Comment    2/19/2021 11:07 PM Lindsey Collins Add [U07 1] COVID-19     2/19/2021 11:07 PM Sarah Collins Add [J06 9] URI (upper respiratory infection)     2/20/2021  2:26 AM Lindsey Collins Add [U07 1,  J12 82] Pneumonia due to COVID-19 virus       ED Disposition     ED Disposition Condition Date/Time Comment    Admit Stable Sat Feb 20, 2021  2:28 AM Case was discussed with Bolivar Vega and the patient's admission status was agreed to be Admission Status: inpatient status to the service of Dr Chris Oliveira           Follow-up Information     Follow up With Specialties Details Why Contact Info Additional Information    Omid Dozier, [address] Nurse Practitioner Schedule an appointment as soon as possible for a visit  For follow up to ensure improvement, and for further testing and treatment as needed [address] [facility],[address]  [place]  [address] Emergency Department Emergency Medicine  If symptoms worsen: worsening SOB, chest pain, uncontrollable fever, passing out, etc 34 [facility] 109 [facility] Emergency Department, [address] [place] [address], [facility]          Patient's Medications   Discharge Prescriptions    ALBUTEROL (PROVENTIL HFA,VENTOLIN HFA) 90 MCG/ACT INHALER    Inhale 2 puffs every 4 (four) hours as needed for wheezing       Start Date: 2/19/2021 End Date: --       Order Dose: 2 puffs       Quantity: 1 Inhaler    Refills: 0    DOXYCYCLINE HYCLATE (VIBRAMYCIN) 100 MG CAPSULE    Take 1 capsule (100 mg total) by mouth 2 (two) times a day for 10 days       Start Date: 2/19/2021 End Date: 3/1/2021       Order Dose: 100 mg       Quantity: 20 capsule    Refills: 0    METHYLPREDNISOLONE 4 MG TABLET THERAPY PACK    Use as directed on package       Start Date: 2/19/2021 End Date: --       Order Dose: --       Quantity: 21 tablet    Refills: 0     No discharge procedures on file      PDMP Review     None          ED Provider  Electronically Signed by           Baron Peralta,   02/20/21 [address] [place] [address], DO  02/20/21 [address], DO  02/20/21 9107

## 2021-02-20 NOTE — PLAN OF CARE
Problem: Potential for Falls  Goal: Patient will remain free of falls  Description: INTERVENTIONS:  - Assess patient frequently for physical needs  -  Identify cognitive and physical deficits and behaviors that affect risk of falls    -  [place] fall precautions as indicated by assessment   - Educate patient/family on patient safety including physical limitations  - Instruct patient to call for assistance with activity based on assessment  - Modify environment to reduce risk of injury  - Consider OT/PT consult to assist with strengthening/mobility  Outcome: Progressing     Problem: PAIN - ADULT  Goal: Verbalizes/displays adequate comfort level or baseline comfort level  Description: Interventions:  - Encourage patient to monitor pain and request assistance  - Assess pain using appropriate pain scale  - Administer analgesics based on type and severity of pain and evaluate response  - Implement non-pharmacological measures as appropriate and evaluate response  - Consider cultural and social influences on pain and pain management  - Notify physician/advanced practitioner if interventions unsuccessful or patient reports new pain  Outcome: Progressing     Problem: INFECTION - ADULT  Goal: Absence or prevention of progression during hospitalization  Description: INTERVENTIONS:  - Assess and monitor for signs and symptoms of infection  - Monitor lab/diagnostic results  - Monitor all insertion sites, i e  indwelling lines, tubes, and drains  - Monitor endotracheal if appropriate and nasal secretions for changes in amount and color  - [place] appropriate cooling/warming therapies per order  - Administer medications as ordered  - Instruct and encourage patient and family to use good hand hygiene technique  - Identify and instruct in appropriate isolation precautions for identified infection/condition  Outcome: Progressing  Goal: Absence of fever/infection during neutropenic period  Description: INTERVENTIONS:  - Monitor WBC    Outcome: Progressing     Problem: SAFETY ADULT  Goal: Maintain or return to baseline ADL function  Description: INTERVENTIONS:  -  Assess patient's ability to carry out ADLs; assess patient's baseline for ADL function and identify physical deficits which impact ability to perform ADLs (bathing, care of mouth/teeth, toileting, grooming, dressing, etc )  - Assess/evaluate cause of self-care deficits   - Assess range of motion  - Assess patient's mobility; develop plan if impaired  - Assess patient's need for assistive devices and provide as appropriate  - Encourage maximum independence but intervene and supervise when necessary  - Involve family in performance of ADLs  - Assess for home care needs following discharge   - Consider OT consult to assist with ADL evaluation and planning for discharge  - Provide patient education as appropriate  Outcome: Progressing  Goal: Maintain or return mobility status to optimal level  Description: INTERVENTIONS:  - Assess patient's baseline mobility status (ambulation, transfers, stairs, etc )    - Identify cognitive and physical deficits and behaviors that affect mobility  - Identify mobility aids required to assist with transfers and/or ambulation (gait belt, sit-to-stand, lift, walker, cane, etc )  - [place] fall precautions as indicated by assessment  - Record patient progress and toleration of activity level on Mobility SBAR; progress patient to next Phase/Stage  - Instruct patient to call for assistance with activity based on assessment  - Consider rehabilitation consult to assist with strengthening/weightbearing, etc   Outcome: Progressing     Problem: DISCHARGE PLANNING  Goal: Discharge to home or other facility with appropriate resources  Description: INTERVENTIONS:  - Identify barriers to discharge w/patient and caregiver  - Arrange for needed discharge resources and transportation as appropriate  - Identify discharge learning needs (meds, wound care, etc )  - Arrange for interpretive services to assist at discharge as needed  - Refer to Case Management Department for coordinating discharge planning if the patient needs post-hospital services based on physician/advanced practitioner order or complex needs related to functional status, cognitive ability, or social support system  Outcome: Progressing     Problem: Knowledge Deficit  Goal: Patient/family/caregiver demonstrates understanding of disease process, treatment plan, medications, and discharge instructions  Description: Complete learning assessment and assess knowledge base    Interventions:  - Provide teaching at level of understanding  - Provide teaching via preferred learning methods  Outcome: Progressing     Problem: RESPIRATORY - ADULT  Goal: Achieves optimal ventilation and oxygenation  Description: INTERVENTIONS:  - Assess for changes in respiratory status  - Assess for changes in mentation and behavior  - Position to facilitate oxygenation and minimize respiratory effort  - Oxygen administered by appropriate delivery if ordered  - Initiate smoking cessation education as indicated  - Encourage broncho-pulmonary hygiene including cough, deep breathe, Incentive Spirometry  - Assess the need for suctioning and aspirate as needed  - Assess and instruct to report SOB or any respiratory difficulty  - Respiratory Therapy support as indicated  Outcome: Progressing

## 2021-02-20 NOTE — ED NOTES
Patient's ambulatory pulse ox 93%-94% on room air  Patient denies SOB with ambulation       Henny Hernandez RN  02/19/21 1734

## 2021-02-20 NOTE — ASSESSMENT & PLAN NOTE
· Patient with slightly elevated LFTs with ALT of 200, AST 97, alk phos 144  · Patient without abdominal pain  · Possibly in the setting of COVID  · Trend LFTs

## 2021-02-20 NOTE — H&P
H&P- Jessica Morning 1945, 76 y o  male MRN: [id number]    Unit/Bed#:  Encounter: [id number]    Primary Care Provider: ELLY Pickett   Date and time admitted to hospital: 2/19/2021  9:22 PM        * Pneumonia due to COVID-19 virus  Assessment & Plan  Patient presents with fatigue, fevers, and diarrhea x1 week  Daughter tested positive for COVID  · Tested positive on 02/19/2021  Mild COVID pathway as below   On my examination patient was saturating at 97% on 0 5 L via nasal cannula  Wean as tolerated for oxygen saturation greater than 92%  · CTA showing No evidence of acute pulmonary embolus  Multifocal areas of groundglass and consolidative opacity in the left lingula and lower lobe which may reflect viral pneumonia in the setting of Covid 19    CBC and CMP daily  Cardiac and inflammatory markers pending  DVT prophylaxis per COVID protocol  Will hold off on dexamethasone as patient is not requiring oxygen at this time  Remdesivir 200mg IV day 1 and 100mg IV daily X4 days   Consideration for convalescent plasma therapy   OOB TID; ambulation and mobilization strongly encouraged  Self proning strongly encouraged  Supportive care    Transaminitis  Assessment & Plan  · Patient with slightly elevated LFTs with ALT of 200, AST 97, alk phos 144  · Patient without abdominal pain  · Possibly in the setting of COVID  · Trend LFTs    Type 2 diabetes mellitus, without long-term current use of insulin (HCC)  Assessment & Plan  No results found for: HGBA1C    No results for input(s): POCGLU in the last 72 hours      Blood Sugar Average: Last 72 hrs:  ·  hemoglobin A1c pending  · Hold metformin while inpatient  · Sliding scale insulin  · Hypoglycemic protocol, carb controlled diet      Hypothyroidism due to Hashimoto's thyroiditis  Assessment & Plan  · Continue levothyroxine    Hyperlipidemia, mixed  Assessment & Plan  · Continue pravastatin    VTE Prophylaxis: Enoxaparin (Lovenox)  / sequential compression device Code Status:  Full code  POLST: POLST is not applicable to this patient    Anticipated Length of Stay:  Patient will be admitted on an Inpatient basis with an anticipated length of stay of  < 2 midnights  Justification for Hospital Stay:  Patient with COVID-19 and several risk factors, requiring IV remdesivir and further monitoring    Total Time for Visit, including Counseling / Coordination of Care: 1 hour  Greater than 50% of this total time spent on direct patient counseling and coordination of care  Chief Complaint:     Chief Complaint   Patient presents with    Flu Symptoms     Pr reports fever, chills, fatigue, and loose stool x 1 week  Daughter covid positive  History of Present Illness:    Heidy Hassan is a 76 y o  male with a past medical history of diabetes, hypothyroidism, hyperlipidemia who presents with fevers, fatigue, and diarrhea x1 week  Patient reports his daughter tested positive for COVID several days ago  He reports for the last week he has been having diarrhea, fevers, and severe fatigue  He denies any cough or feeling short of breath  He denies any nausea or vomiting, but does report nonbloody diarrhea  He also reports decreased appetite and generalized fatigue  He denies any unilateral weakness, numbness or tingling, or visual changes  He denies any muscle pain or lower extremity pain or swelling  He denies any history of lung disease  He does report he has been having fevers at home and has been taking Tylenol for them  Review of Systems:    Review of Systems   Constitutional: Positive for activity change, appetite change, chills, fatigue and fever  HENT: Negative for congestion, postnasal drip, sinus pain and sore throat  Eyes: Negative for visual disturbance  Respiratory: Negative for cough, chest tightness, shortness of breath and wheezing  Cardiovascular: Negative for chest pain, palpitations and leg swelling     Gastrointestinal: Positive for diarrhea  Negative for abdominal pain, blood in stool, constipation, nausea and vomiting  Genitourinary: Negative for dysuria, flank pain and hematuria  Musculoskeletal: Negative for back pain and myalgias  Skin: Negative for rash  Neurological: Positive for weakness (Generalized)  Negative for dizziness, light-headedness and headaches  Hematological: Does not bruise/bleed easily  Psychiatric/Behavioral: Negative for behavioral problems  Past Medical and Surgical History:     Past Medical History:   Diagnosis Date    Anxiety     Coronary artery disease     Depression     Diabetes mellitus ([facility] [address] )     Graves disease     Hyperlipidemia     Hypothyroidism     Myopathy     Neuropathy        Past Surgical History:   Procedure Laterality Date    CORONARY ANGIOPLASTY WITH STENT PLACEMENT         Meds/Allergies:    Prior to Admission medications    Medication Sig Start Date End Date Taking?  Authorizing Provider   DULoxetine (CYMBALTA) 60 mg delayed release capsule Take 60 mg by mouth daily   Yes Historical Provider, MD   glyBURIDE-metFORMIN (Yessica ) 2 5-500 MG per tablet Take 2 tablets by mouth 11/4/20  Yes Historical Provider, MD   levothyroxine (Synthroid) 150 mcg tablet Take 150 mcg by mouth 11/4/20  Yes Historical Provider, MD   pravastatin (PRAVACHOL) 40 mg tablet Take 1 tablet (40 mg total) by mouth daily 10/14/20  Yes Cheyanne Filler, CRNP   primidone (MYSOLINE) 250 mg tablet Take 1 tablet (250 mg total) by mouth every 8 (eight) hours 10/14/20 2/19/21 Yes Cheyanne Filler, CRNP   albuterol (PROVENTIL HFA,VENTOLIN HFA) 90 mcg/act inhaler Inhale 2 puffs every 4 (four) hours as needed for wheezing 2/19/21   Jeff Bullion TREE Collins DO   ALPRAZolam Patricia Macedo) 0 25 mg tablet Take 0 25 mg by mouth 8/28/20   Historical Provider, MD   doxycycline hyclate (VIBRAMYCIN) 100 mg capsule Take 1 capsule (100 mg total) by mouth 2 (two) times a day for 10 days 2/19/21 3/1/21  Eusebia Collins DO methylPREDNISolone 4 MG tablet therapy pack Use as directed on package 2/19/21   Kasia Collins DO   propranolol (INDERAL) 10 mg tablet Take 1 tablet (10 mg total) by mouth 2 (two) times a day 10/14/20 1/12/21  ELLY Owusu     I have reviewed home medications with patient personally  Allergies: Allergies   Allergen Reactions    Penicillin G Anaphylaxis     Other reaction(s): anaphalaxis shock       Social History:     Marital Status: /Civil Union   Substance Use History:   Social History     Substance and Sexual Activity   Alcohol Use Never    Frequency: Never     Social History     Tobacco Use   Smoking Status Former Smoker    Packs/day: 0 25    Years: 5 00    Pack years: 1 25    Types: Cigarettes   Smokeless Tobacco Never Used     Social History     Substance and Sexual Activity   Drug Use Never       Family History:    non-contributory    Physical Exam:     Vitals:   Blood Pressure: 168/83 (02/20/21 0423)  Pulse: 82 (02/20/21 0423)  Temperature: (!) 97 2 °F (36 2 °C) (02/20/21 0423)  Temp Source: Oral (02/20/21 0423)  Respirations: 12 (02/20/21 0423)  Height: 5' 11" (180 3 cm) (02/20/21 0423)  Weight - Scale: 96 9 kg (213 lb 10 oz) (02/20/21 0423)  SpO2: 97 % (02/20/21 0423)    Physical Exam  Vitals signs reviewed  Constitutional:       General: He is not in acute distress  Appearance: He is ill-appearing  Comments: Patient is lying in bed, no acute distress  Appears ill but nontoxic  Appears stated age  Answers all questions appropriately  HENT:      Head: Normocephalic and atraumatic  Eyes:      Pupils: Pupils are equal, round, and reactive to light  Neck:      Musculoskeletal: Normal range of motion  Cardiovascular:      Rate and Rhythm: Normal rate and regular rhythm  Pulmonary:      Effort: Pulmonary effort is normal  No respiratory distress  Breath sounds: Normal breath sounds  No wheezing     Abdominal:      General: Bowel sounds are normal  Palpations: Abdomen is soft  Tenderness: There is no abdominal tenderness  Musculoskeletal: Normal range of motion  Right lower leg: No edema  Left lower leg: No edema  Skin:     General: Skin is warm and dry  Neurological:      General: No focal deficit present  Mental Status: He is alert and oriented to person, place, and time  Psychiatric:         Mood and Affect: Mood normal          Behavior: Behavior normal          Additional Data:     Lab Results: I have personally reviewed pertinent reports  Results from last 7 days   Lab Units 02/20/21  0052   WBC Thousand/uL 5 95   HEMOGLOBIN g/dL 15 4   HEMATOCRIT % 45 6   PLATELETS Thousands/uL 234   NEUTROS PCT % 63   LYMPHS PCT % 25   MONOS PCT % 11   EOS PCT % 0     Results from last 7 days   Lab Units 02/20/21  0052   SODIUM mmol/L 137   POTASSIUM mmol/L 4 5   CHLORIDE mmol/L 100   CO2 mmol/L 28   BUN mg/dL 15   CREATININE mg/dL 1 02   ANION GAP mmol/L 9   CALCIUM mg/dL 9 0   ALBUMIN g/dL 3 8   TOTAL BILIRUBIN mg/dL 0 50   ALK PHOS U/L 144*   ALT U/L 200*   AST U/L 97*   GLUCOSE RANDOM mg/dL 120                       Imaging: I have personally reviewed pertinent reports  CTA ED chest PE Study   ED Interpretation by Mehrdad March DO (02/20 0158)      No evidence of acute pulmonary embolus      Multifocal areas of groundglass and consolidative opacity in the left lingula and lower lobe which may reflect viral pneumonia in the setting of Covid 19  Final Result by Idania Estrada MD (02/20 2354)      No evidence of acute pulmonary embolus  Multifocal areas of groundglass and consolidative opacity in the left lingula and lower lobe which may reflect viral pneumonia in the setting of Covid 19              Workstation performed: [id number]         XR chest portable    (Results Pending)       EKG, Pathology, and Other Studies Reviewed on Admission:   · EKG:  Normal sinus rhythm with PVCs, no ischemic changes    Allscripts / Epic Records Reviewed: Yes     ** Please Note: This note has been constructed using a voice recognition system   **

## 2021-02-21 LAB
ALBUMIN SERPL BCP-MCNC: 3.1 G/DL (ref 3.5–5)
ALP SERPL-CCNC: 115 U/L (ref 46–116)
ALT SERPL W P-5'-P-CCNC: 141 U/L (ref 12–78)
ANION GAP SERPL CALCULATED.3IONS-SCNC: 4 MMOL/L (ref 4–13)
AST SERPL W P-5'-P-CCNC: 59 U/L (ref 5–45)
BASOPHILS # BLD AUTO: 0.01 THOUSANDS/ΜL (ref 0–0.1)
BASOPHILS NFR BLD AUTO: 0 % (ref 0–1)
BILIRUB SERPL-MCNC: 0.3 MG/DL (ref 0.2–1)
BUN SERPL-MCNC: 21 MG/DL (ref 5–25)
CALCIUM ALBUM COR SERPL-MCNC: 8.7 MG/DL (ref 8.3–10.1)
CALCIUM SERPL-MCNC: 8 MG/DL (ref 8.3–10.1)
CHLORIDE SERPL-SCNC: 101 MMOL/L (ref 100–108)
CO2 SERPL-SCNC: 30 MMOL/L (ref 21–32)
CREAT SERPL-MCNC: 1.09 MG/DL (ref 0.6–1.3)
CRP SERPL QL: 30.4 MG/L
EOSINOPHIL # BLD AUTO: 0.02 THOUSAND/ΜL (ref 0–0.61)
EOSINOPHIL NFR BLD AUTO: 0 % (ref 0–6)
ERYTHROCYTE [DISTWIDTH] IN BLOOD BY AUTOMATED COUNT: 12.7 % (ref 11.6–15.1)
GFR SERPL CREATININE-BSD FRML MDRD: 66 ML/MIN/1.73SQ M
GLUCOSE SERPL-MCNC: 103 MG/DL (ref 65–140)
GLUCOSE SERPL-MCNC: 112 MG/DL (ref 65–140)
GLUCOSE SERPL-MCNC: 116 MG/DL (ref 65–140)
GLUCOSE SERPL-MCNC: 197 MG/DL (ref 65–140)
GLUCOSE SERPL-MCNC: 207 MG/DL (ref 65–140)
HCT VFR BLD AUTO: 41.1 % (ref 36.5–49.3)
HGB BLD-MCNC: 13.8 G/DL (ref 12–17)
IMM GRANULOCYTES # BLD AUTO: 0.02 THOUSAND/UL (ref 0–0.2)
IMM GRANULOCYTES NFR BLD AUTO: 0 % (ref 0–2)
LYMPHOCYTES # BLD AUTO: 1.59 THOUSANDS/ΜL (ref 0.6–4.47)
LYMPHOCYTES NFR BLD AUTO: 36 % (ref 14–44)
MCH RBC QN AUTO: 31.9 PG (ref 26.8–34.3)
MCHC RBC AUTO-ENTMCNC: 33.6 G/DL (ref 31.4–37.4)
MCV RBC AUTO: 95 FL (ref 82–98)
MONOCYTES # BLD AUTO: 0.85 THOUSAND/ΜL (ref 0.17–1.22)
MONOCYTES NFR BLD AUTO: 19 % (ref 4–12)
NEUTROPHILS # BLD AUTO: 1.97 THOUSANDS/ΜL (ref 1.85–7.62)
NEUTS SEG NFR BLD AUTO: 45 % (ref 43–75)
NRBC BLD AUTO-RTO: 0 /100 WBCS
PLATELET # BLD AUTO: 185 THOUSANDS/UL (ref 149–390)
PMV BLD AUTO: 9.1 FL (ref 8.9–12.7)
POTASSIUM SERPL-SCNC: 4 MMOL/L (ref 3.5–5.3)
PROCALCITONIN SERPL-MCNC: <0.05 NG/ML
PROT SERPL-MCNC: 6.7 G/DL (ref 6.4–8.2)
RBC # BLD AUTO: 4.33 MILLION/UL (ref 3.88–5.62)
SODIUM SERPL-SCNC: 135 MMOL/L (ref 136–145)
WBC # BLD AUTO: 4.46 THOUSAND/UL (ref 4.31–10.16)

## 2021-02-21 PROCEDURE — 84145 PROCALCITONIN (PCT): CPT | Performed by: INTERNAL MEDICINE

## 2021-02-21 PROCEDURE — 82948 REAGENT STRIP/BLOOD GLUCOSE: CPT

## 2021-02-21 PROCEDURE — 99232 SBSQ HOSP IP/OBS MODERATE 35: CPT | Performed by: INTERNAL MEDICINE

## 2021-02-21 PROCEDURE — 85025 COMPLETE CBC W/AUTO DIFF WBC: CPT | Performed by: INTERNAL MEDICINE

## 2021-02-21 PROCEDURE — 80053 COMPREHEN METABOLIC PANEL: CPT | Performed by: INTERNAL MEDICINE

## 2021-02-21 PROCEDURE — 86140 C-REACTIVE PROTEIN: CPT | Performed by: INTERNAL MEDICINE

## 2021-02-21 RX ORDER — BENZONATATE 100 MG/1
100 CAPSULE ORAL 3 TIMES DAILY PRN
Status: DISCONTINUED | OUTPATIENT
Start: 2021-02-21 | End: 2021-02-22 | Stop reason: HOSPADM

## 2021-02-21 RX ORDER — PROPOFOL 10 MG/ML
INJECTION, EMULSION INTRAVENOUS
Status: DISPENSED
Start: 2021-02-21 | End: 2021-02-22

## 2021-02-21 RX ADMIN — DOCUSATE SODIUM 100 MG: 100 CAPSULE, LIQUID FILLED ORAL at 17:15

## 2021-02-21 RX ADMIN — INSULIN LISPRO 1 UNITS: 100 INJECTION, SOLUTION INTRAVENOUS; SUBCUTANEOUS at 12:22

## 2021-02-21 RX ADMIN — Medication 2000 UNITS: at 08:32

## 2021-02-21 RX ADMIN — OXYCODONE HYDROCHLORIDE AND ACETAMINOPHEN 1000 MG: 500 TABLET ORAL at 22:00

## 2021-02-21 RX ADMIN — DULOXETINE HYDROCHLORIDE 60 MG: 30 CAPSULE, DELAYED RELEASE ORAL at 08:32

## 2021-02-21 RX ADMIN — DOCUSATE SODIUM 100 MG: 100 CAPSULE, LIQUID FILLED ORAL at 08:33

## 2021-02-21 RX ADMIN — OXYCODONE HYDROCHLORIDE AND ACETAMINOPHEN 1000 MG: 500 TABLET ORAL at 08:32

## 2021-02-21 RX ADMIN — PROPRANOLOL HYDROCHLORIDE 10 MG: 20 TABLET ORAL at 08:32

## 2021-02-21 RX ADMIN — LEVOTHYROXINE SODIUM 150 MCG: 150 TABLET ORAL at 05:19

## 2021-02-21 RX ADMIN — INSULIN LISPRO 1 UNITS: 100 INJECTION, SOLUTION INTRAVENOUS; SUBCUTANEOUS at 17:14

## 2021-02-21 RX ADMIN — PRAVASTATIN SODIUM 40 MG: 40 TABLET ORAL at 17:15

## 2021-02-21 RX ADMIN — REMDESIVIR 100 MG: 100 INJECTION, POWDER, LYOPHILIZED, FOR SOLUTION INTRAVENOUS at 05:20

## 2021-02-21 RX ADMIN — ZINC SULFATE 220 MG (50 MG) CAPSULE 220 MG: CAPSULE at 08:33

## 2021-02-21 RX ADMIN — ENOXAPARIN SODIUM 30 MG: 30 INJECTION SUBCUTANEOUS at 08:33

## 2021-02-21 RX ADMIN — ENOXAPARIN SODIUM 30 MG: 30 INJECTION SUBCUTANEOUS at 22:00

## 2021-02-21 RX ADMIN — PROPRANOLOL HYDROCHLORIDE 10 MG: 20 TABLET ORAL at 17:15

## 2021-02-21 NOTE — ASSESSMENT & PLAN NOTE
· Patient with slightly elevated LFTs with ALT of 200, AST 97, alk phos 144  · Patient without abdominal pain  · Possibly in the setting of COVID  · Improving

## 2021-02-21 NOTE — PLAN OF CARE
Problem: Potential for Falls  Goal: Patient will remain free of falls  Description: INTERVENTIONS:  - Assess patient frequently for physical needs  -  Identify cognitive and physical deficits and behaviors that affect risk of falls    -  [place] fall precautions as indicated by assessment   - Educate patient/family on patient safety including physical limitations  - Instruct patient to call for assistance with activity based on assessment  - Modify environment to reduce risk of injury  - Consider OT/PT consult to assist with strengthening/mobility  Outcome: Progressing     Problem: PAIN - ADULT  Goal: Verbalizes/displays adequate comfort level or baseline comfort level  Description: Interventions:  - Encourage patient to monitor pain and request assistance  - Assess pain using appropriate pain scale  - Administer analgesics based on type and severity of pain and evaluate response  - Implement non-pharmacological measures as appropriate and evaluate response  - Consider cultural and social influences on pain and pain management  - Notify physician/advanced practitioner if interventions unsuccessful or patient reports new pain  Outcome: Progressing     Problem: INFECTION - ADULT  Goal: Absence or prevention of progression during hospitalization  Description: INTERVENTIONS:  - Assess and monitor for signs and symptoms of infection  - Monitor lab/diagnostic results  - Monitor all insertion sites, i e  indwelling lines, tubes, and drains  - Monitor endotracheal if appropriate and nasal secretions for changes in amount and color  - [place] appropriate cooling/warming therapies per order  - Administer medications as ordered  - Instruct and encourage patient and family to use good hand hygiene technique  - Identify and instruct in appropriate isolation precautions for identified infection/condition  Outcome: Progressing  Goal: Absence of fever/infection during neutropenic period  Description: INTERVENTIONS:  - Monitor WBC    Outcome: Progressing     Problem: SAFETY ADULT  Goal: Maintain or return to baseline ADL function  Description: INTERVENTIONS:  -  Assess patient's ability to carry out ADLs; assess patient's baseline for ADL function and identify physical deficits which impact ability to perform ADLs (bathing, care of mouth/teeth, toileting, grooming, dressing, etc )  - Assess/evaluate cause of self-care deficits   - Assess range of motion  - Assess patient's mobility; develop plan if impaired  - Assess patient's need for assistive devices and provide as appropriate  - Encourage maximum independence but intervene and supervise when necessary  - Involve family in performance of ADLs  - Assess for home care needs following discharge   - Consider OT consult to assist with ADL evaluation and planning for discharge  - Provide patient education as appropriate  Outcome: Progressing  Goal: Maintain or return mobility status to optimal level  Description: INTERVENTIONS:  - Assess patient's baseline mobility status (ambulation, transfers, stairs, etc )    - Identify cognitive and physical deficits and behaviors that affect mobility  - Identify mobility aids required to assist with transfers and/or ambulation (gait belt, sit-to-stand, lift, walker, cane, etc )  - [place] fall precautions as indicated by assessment  - Record patient progress and toleration of activity level on Mobility SBAR; progress patient to next Phase/Stage  - Instruct patient to call for assistance with activity based on assessment  - Consider rehabilitation consult to assist with strengthening/weightbearing, etc   Outcome: Progressing     Problem: DISCHARGE PLANNING  Goal: Discharge to home or other facility with appropriate resources  Description: INTERVENTIONS:  - Identify barriers to discharge w/patient and caregiver  - Arrange for needed discharge resources and transportation as appropriate  - Identify discharge learning needs (meds, wound care, etc )  - Arrange for interpretive services to assist at discharge as needed  - Refer to Case Management Department for coordinating discharge planning if the patient needs post-hospital services based on physician/advanced practitioner order or complex needs related to functional status, cognitive ability, or social support system  Outcome: Progressing     Problem: Knowledge Deficit  Goal: Patient/family/caregiver demonstrates understanding of disease process, treatment plan, medications, and discharge instructions  Description: Complete learning assessment and assess knowledge base    Interventions:  - Provide teaching at level of understanding  - Provide teaching via preferred learning methods  Outcome: Progressing     Problem: RESPIRATORY - ADULT  Goal: Achieves optimal ventilation and oxygenation  Description: INTERVENTIONS:  - Assess for changes in respiratory status  - Assess for changes in mentation and behavior  - Position to facilitate oxygenation and minimize respiratory effort  - Oxygen administered by appropriate delivery if ordered  - Initiate smoking cessation education as indicated  - Encourage broncho-pulmonary hygiene including cough, deep breathe, Incentive Spirometry  - Assess the need for suctioning and aspirate as needed  - Assess and instruct to report SOB or any respiratory difficulty  - Respiratory Therapy support as indicated  Outcome: Progressing

## 2021-02-21 NOTE — ASSESSMENT & PLAN NOTE
Patient presents with fatigue, fevers, and diarrhea x1 week  · Tested positive on 02/19/2021  Mild COVID pathway as below   Initially he was mild hypoxic, currently saturating well on room air  · CTA showing No evidence of acute pulmonary embolus  Multifocal areas of groundglass and consolidative opacity in the left lingula and lower lobe which may reflect viral pneumonia in the setting of Covid 19    Started on remdesivir  Today day 2  Continue  Clinically patient looks much better  Remains afebrile  Wants to go home  Monitor clinically  Since no hypoxia, never started on dexamethasone    Monitor off now

## 2021-02-21 NOTE — PROGRESS NOTES
Progress Note - Evie Zamudio 1945, 76 y o  male MRN: [id number]    Unit/Bed#:  Encounter: [id number]    Primary Care Provider: ELLY Singh   Date and time admitted to hospital: 2/19/2021  9:22 PM        Transaminitis  Assessment & Plan  · Patient with slightly elevated LFTs with ALT of 200, AST 97, alk phos 144  · Patient without abdominal pain  · Possibly in the setting of COVID  · Improving  Type 2 diabetes mellitus, without long-term current use of insulin [facility])  Assessment & Plan  Lab Results   Component Value Date    HGBA1C 6 0 (H) 02/20/2021       Recent Labs     02/20/21  1539 02/20/21  2059 02/21/21  0722 02/21/21  1115   POCGLU 161* 120 103 207*       Blood Sugar Average: Last 72 hrs:  · (P) 130 [id number] hemoglobin A1c pending  · Hold metformin while inpatient  · Sliding scale insulin  · Hypoglycemic protocol, carb controlled diet      Hypothyroidism due to Hashimoto's thyroiditis  Assessment & Plan  · Continue levothyroxine    Hyperlipidemia, mixed  Assessment & Plan  · Continue pravastatin    * Pneumonia due to COVID-19 virus  Assessment & Plan  Patient presents with fatigue, fevers, and diarrhea x1 week  · Tested positive on 02/19/2021  Mild COVID pathway as below   Initially he was mild hypoxic, currently saturating well on room air  · CTA showing No evidence of acute pulmonary embolus  Multifocal areas of groundglass and consolidative opacity in the left lingula and lower lobe which may reflect viral pneumonia in the setting of Covid 19    Started on remdesivir  Today day 2  Continue  Clinically patient looks much better  Remains afebrile  Wants to go home  Monitor clinically  Since no hypoxia, never started on dexamethasone  Monitor off now      VTE Pharmacologic Prophylaxis:   Pharmacologic: Enoxaparin (Lovenox)  Mechanical VTE Prophylaxis in Place: Yes    Patient Centered Rounds: I have performed bedside rounds with nursing staff today      Discussions with Specialists or Other Care Team Provider:     Education and Discussions with Family / Patient:  Patient, called wife    Time Spent for Care: More than 50% of total time spent on counseling and coordination of care as described above  Current Length of Stay: 1 day(s)    Current Patient Status: Inpatient   Certification Statement: The patient will continue to require additional inpatient hospital stay due to See above    Discharge Plan:  24 hours    Code Status: Level 1 - Full Code      Subjective:   I have seen and examined the patient bedside this morning  Patient feels much better  Denies any  new complaints  No chest pain, shortness of breath     Afebrile    Objective:     Vitals:   Temp (24hrs), Av °F (36 7 °C), Min:97 5 °F (36 4 °C), Max:98 9 °F (37 2 °C)    Temp:  [97 5 °F (36 4 °C)-98 9 °F (37 2 °C)] 97 5 °F (36 4 °C)  HR:  [58-84] 58  Resp:  [16-18] 16  BP: (130-145)/(70-74) 135/72  SpO2:  [93 %-96 %] 93 %  Body mass index is 29 79 kg/m²  Input and Output Summary (last 24 hours):     No intake or output data in the 24 hours ending 21 1548    Physical Exam:     Physical Exam  Limited due to pandemic COVID-19 infection      General- Awake, alert and oriented x 3, looks comfortable  Respiratory system- breathing on room air, not using any accessory muscles  CVS-normal S1, S2  Psych- No acute psychosis  Musculoskeletal-no gross deformity  CNS- moving all extremities    Additional Data:     Labs:    Results from last 7 days   Lab Units 21  0155   WBC Thousand/uL 4 46   HEMOGLOBIN g/dL 13 8   HEMATOCRIT % 41 1   PLATELETS Thousands/uL 185   NEUTROS PCT % 45   LYMPHS PCT % 36   MONOS PCT % 19*   EOS PCT % 0     Results from last 7 days   Lab Units 21  0155   SODIUM mmol/L 135*   POTASSIUM mmol/L 4 0   CHLORIDE mmol/L 101   CO2 mmol/L 30   BUN mg/dL 21   CREATININE mg/dL 1 09   ANION GAP mmol/L 4   CALCIUM mg/dL 8 0*   ALBUMIN g/dL 3 1*   TOTAL BILIRUBIN mg/dL 0 30   ALK PHOS U/L 115 ALT U/L 141*   AST U/L 59*   GLUCOSE RANDOM mg/dL 116         Results from last 7 days   Lab Units 02/21/21  1537 02/21/21  1115 02/21/21  0722 02/20/21  2059 02/20/21  1539 02/20/21  1130 02/20/21  0728   POC GLUCOSE mg/dl 197* 207* 103 120 161* 132 98     Results from last 7 days   Lab Units 02/20/21  0052   HEMOGLOBIN A1C % 6 0*     Results from last 7 days   Lab Units 02/20/21  0555   LACTIC ACID mmol/L 0 9   PROCALCITONIN ng/ml 0 06           * I Have Reviewed All Lab Data Listed Above  * Additional Pertinent Lab Tests Reviewed: All Labs Within Last 24 Hours Reviewed    Imaging:    Imaging Reports Reviewed Today Include:   Imaging Personally Reviewed by Myself Includes:      Recent Cultures (last 7 days):     Results from last 7 days   Lab Units 02/20/21  0555   BLOOD CULTURE  No Growth at 24 hrs  No Growth at 24 hrs         Last 24 Hours Medication List:   Current Facility-Administered Medications   Medication Dose Route Frequency Provider Last Rate    acetaminophen  650 mg Oral Q6H PRN Pham JOSE Richardson      ascorbic acid  1,000 mg Oral Q12H [address] [place], [place]      benzonatate  100 mg Oral TID PRN Ara Lerner MD      cholecalciferol  2,000 Units Oral Daily Pham JOSE Richardson      docusate sodium  100 mg Oral BID Phma JOSE Richardson      DULoxetine  60 mg Oral Daily Pham JOSE Richardson      enoxaparin  30 mg Subcutaneous Q12H [address] [place], [place]      insulin lispro  1-5 Units Subcutaneous TID [facility] Pham Richardson PA-C      levothyroxine  150 mcg Oral Early Morning [facility] [place]      zinc sulfate  220 mg Oral Daily Pham Richardson PA-C      Followed by   Angelique Santamaria ON 2/27/2021] multivitamin-minerals  1 tablet Oral Daily Pham JOSE Richardson      ondansetron  4 mg Intravenous Q6H PRN Phamwesley Richardson PA-C      pravastatin  40 mg Oral Daily Pham [facility] [place]      propofol           propranolol  10 mg Oral BID Pham Bench JOSE      remdesivir  100 mg Intravenous Q24H JOSE HOLLAND          Today, Patient Was Seen By: Renetta Elkins MD    ** Please Note: Dictation voice to text software may have been used in the creation of this document   **

## 2021-02-21 NOTE — ASSESSMENT & PLAN NOTE
Lab Results   Component Value Date    HGBA1C 6 0 (H) 02/20/2021       Recent Labs     02/20/21  1539 02/20/21 2059 02/21/21  0722 02/21/21  1115   POCGLU 161* 120 103 207*       Blood Sugar Average: Last 72 hrs:  · (P) 224 [id number] hemoglobin A1c pending  · Hold metformin while inpatient  · Sliding scale insulin  · Hypoglycemic protocol, carb controlled diet

## 2021-02-22 VITALS
HEART RATE: 58 BPM | TEMPERATURE: 96.6 F | BODY MASS INDEX: 29.91 KG/M2 | WEIGHT: 213.63 LBS | SYSTOLIC BLOOD PRESSURE: 130 MMHG | HEIGHT: 71 IN | RESPIRATION RATE: 18 BRPM | OXYGEN SATURATION: 95 % | DIASTOLIC BLOOD PRESSURE: 73 MMHG

## 2021-02-22 LAB
ALBUMIN SERPL BCP-MCNC: 3.3 G/DL (ref 3.5–5)
ALP SERPL-CCNC: 140 U/L (ref 46–116)
ALT SERPL W P-5'-P-CCNC: 163 U/L (ref 12–78)
ANION GAP SERPL CALCULATED.3IONS-SCNC: 8 MMOL/L (ref 4–13)
AST SERPL W P-5'-P-CCNC: 68 U/L (ref 5–45)
BASOPHILS # BLD AUTO: 0.02 THOUSANDS/ΜL (ref 0–0.1)
BASOPHILS NFR BLD AUTO: 1 % (ref 0–1)
BILIRUB SERPL-MCNC: 0.3 MG/DL (ref 0.2–1)
BUN SERPL-MCNC: 16 MG/DL (ref 5–25)
CALCIUM ALBUM COR SERPL-MCNC: 9 MG/DL (ref 8.3–10.1)
CALCIUM SERPL-MCNC: 8.4 MG/DL (ref 8.3–10.1)
CHLORIDE SERPL-SCNC: 101 MMOL/L (ref 100–108)
CO2 SERPL-SCNC: 29 MMOL/L (ref 21–32)
CREAT SERPL-MCNC: 0.96 MG/DL (ref 0.6–1.3)
CRP SERPL QL: 27 MG/L
EOSINOPHIL # BLD AUTO: 0.06 THOUSAND/ΜL (ref 0–0.61)
EOSINOPHIL NFR BLD AUTO: 2 % (ref 0–6)
ERYTHROCYTE [DISTWIDTH] IN BLOOD BY AUTOMATED COUNT: 12.6 % (ref 11.6–15.1)
GFR SERPL CREATININE-BSD FRML MDRD: 77 ML/MIN/1.73SQ M
GLUCOSE SERPL-MCNC: 131 MG/DL (ref 65–140)
GLUCOSE SERPL-MCNC: 171 MG/DL (ref 65–140)
GLUCOSE SERPL-MCNC: 205 MG/DL (ref 65–140)
HCT VFR BLD AUTO: 45.7 % (ref 36.5–49.3)
HGB BLD-MCNC: 15.3 G/DL (ref 12–17)
IMM GRANULOCYTES # BLD AUTO: 0.01 THOUSAND/UL (ref 0–0.2)
IMM GRANULOCYTES NFR BLD AUTO: 0 % (ref 0–2)
LYMPHOCYTES # BLD AUTO: 1.73 THOUSANDS/ΜL (ref 0.6–4.47)
LYMPHOCYTES NFR BLD AUTO: 44 % (ref 14–44)
MCH RBC QN AUTO: 31.9 PG (ref 26.8–34.3)
MCHC RBC AUTO-ENTMCNC: 33.5 G/DL (ref 31.4–37.4)
MCV RBC AUTO: 95 FL (ref 82–98)
MONOCYTES # BLD AUTO: 0.61 THOUSAND/ΜL (ref 0.17–1.22)
MONOCYTES NFR BLD AUTO: 16 % (ref 4–12)
NEUTROPHILS # BLD AUTO: 1.43 THOUSANDS/ΜL (ref 1.85–7.62)
NEUTS SEG NFR BLD AUTO: 37 % (ref 43–75)
NRBC BLD AUTO-RTO: 0 /100 WBCS
PLATELET # BLD AUTO: 202 THOUSANDS/UL (ref 149–390)
PMV BLD AUTO: 8.9 FL (ref 8.9–12.7)
POTASSIUM SERPL-SCNC: 4.2 MMOL/L (ref 3.5–5.3)
PROT SERPL-MCNC: 7.6 G/DL (ref 6.4–8.2)
RBC # BLD AUTO: 4.8 MILLION/UL (ref 3.88–5.62)
SODIUM SERPL-SCNC: 138 MMOL/L (ref 136–145)
WBC # BLD AUTO: 3.86 THOUSAND/UL (ref 4.31–10.16)

## 2021-02-22 PROCEDURE — 85025 COMPLETE CBC W/AUTO DIFF WBC: CPT | Performed by: INTERNAL MEDICINE

## 2021-02-22 PROCEDURE — 80053 COMPREHEN METABOLIC PANEL: CPT | Performed by: INTERNAL MEDICINE

## 2021-02-22 PROCEDURE — 82948 REAGENT STRIP/BLOOD GLUCOSE: CPT

## 2021-02-22 PROCEDURE — 99238 HOSP IP/OBS DSCHRG MGMT 30/<: CPT | Performed by: INTERNAL MEDICINE

## 2021-02-22 PROCEDURE — 86140 C-REACTIVE PROTEIN: CPT | Performed by: INTERNAL MEDICINE

## 2021-02-22 RX ORDER — MULTIVITAMIN/IRON/FOLIC ACID 18MG-0.4MG
1 TABLET ORAL DAILY
Qty: 30 TABLET | Refills: 0 | Status: SHIPPED | OUTPATIENT
Start: 2021-02-27 | End: 2021-03-29

## 2021-02-22 RX ORDER — ACETAMINOPHEN 325 MG/1
650 TABLET ORAL EVERY 6 HOURS PRN
Refills: 0
Start: 2021-02-22 | End: 2021-02-27

## 2021-02-22 RX ORDER — MELATONIN
2000 DAILY
Qty: 20 TABLET | Refills: 0 | Status: SHIPPED | OUTPATIENT
Start: 2021-02-23 | End: 2021-03-05

## 2021-02-22 RX ORDER — BENZONATATE 100 MG/1
100 CAPSULE ORAL 3 TIMES DAILY PRN
Qty: 20 CAPSULE | Refills: 0 | Status: SHIPPED | OUTPATIENT
Start: 2021-02-22

## 2021-02-22 RX ADMIN — REMDESIVIR 100 MG: 100 INJECTION, POWDER, LYOPHILIZED, FOR SOLUTION INTRAVENOUS at 05:30

## 2021-02-22 RX ADMIN — LEVOTHYROXINE SODIUM 150 MCG: 150 TABLET ORAL at 05:29

## 2021-02-22 RX ADMIN — OXYCODONE HYDROCHLORIDE AND ACETAMINOPHEN 1000 MG: 500 TABLET ORAL at 08:41

## 2021-02-22 RX ADMIN — DULOXETINE HYDROCHLORIDE 60 MG: 30 CAPSULE, DELAYED RELEASE ORAL at 08:41

## 2021-02-22 RX ADMIN — Medication 2000 UNITS: at 08:41

## 2021-02-22 RX ADMIN — INSULIN LISPRO 1 UNITS: 100 INJECTION, SOLUTION INTRAVENOUS; SUBCUTANEOUS at 12:19

## 2021-02-22 RX ADMIN — ENOXAPARIN SODIUM 30 MG: 30 INJECTION SUBCUTANEOUS at 08:41

## 2021-02-22 RX ADMIN — PROPRANOLOL HYDROCHLORIDE 10 MG: 20 TABLET ORAL at 08:41

## 2021-02-22 RX ADMIN — DOCUSATE SODIUM 100 MG: 100 CAPSULE, LIQUID FILLED ORAL at 08:41

## 2021-02-22 RX ADMIN — ZINC SULFATE 220 MG (50 MG) CAPSULE 220 MG: CAPSULE at 08:41

## 2021-02-22 NOTE — PLAN OF CARE
Problem: Potential for Falls  Goal: Patient will remain free of falls  Description: INTERVENTIONS:  - Assess patient frequently for physical needs  -  Identify cognitive and physical deficits and behaviors that affect risk of falls    -  [place] fall precautions as indicated by assessment   - Educate patient/family on patient safety including physical limitations  - Instruct patient to call for assistance with activity based on assessment  - Modify environment to reduce risk of injury  - Consider OT/PT consult to assist with strengthening/mobility  Outcome: Progressing     Problem: PAIN - ADULT  Goal: Verbalizes/displays adequate comfort level or baseline comfort level  Description: Interventions:  - Encourage patient to monitor pain and request assistance  - Assess pain using appropriate pain scale  - Administer analgesics based on type and severity of pain and evaluate response  - Implement non-pharmacological measures as appropriate and evaluate response  - Consider cultural and social influences on pain and pain management  - Notify physician/advanced practitioner if interventions unsuccessful or patient reports new pain  Outcome: Progressing     Problem: INFECTION - ADULT  Goal: Absence or prevention of progression during hospitalization  Description: INTERVENTIONS:  - Assess and monitor for signs and symptoms of infection  - Monitor lab/diagnostic results  - Monitor all insertion sites, i e  indwelling lines, tubes, and drains  - Monitor endotracheal if appropriate and nasal secretions for changes in amount and color  - [place] appropriate cooling/warming therapies per order  - Administer medications as ordered  - Instruct and encourage patient and family to use good hand hygiene technique  - Identify and instruct in appropriate isolation precautions for identified infection/condition  Outcome: Progressing  Goal: Absence of fever/infection during neutropenic period  Description: INTERVENTIONS:  - Monitor WBC    Outcome: Progressing     Problem: SAFETY ADULT  Goal: Maintain or return to baseline ADL function  Description: INTERVENTIONS:  -  Assess patient's ability to carry out ADLs; assess patient's baseline for ADL function and identify physical deficits which impact ability to perform ADLs (bathing, care of mouth/teeth, toileting, grooming, dressing, etc )  - Assess/evaluate cause of self-care deficits   - Assess range of motion  - Assess patient's mobility; develop plan if impaired  - Assess patient's need for assistive devices and provide as appropriate  - Encourage maximum independence but intervene and supervise when necessary  - Involve family in performance of ADLs  - Assess for home care needs following discharge   - Consider OT consult to assist with ADL evaluation and planning for discharge  - Provide patient education as appropriate  Outcome: Progressing  Goal: Maintain or return mobility status to optimal level  Description: INTERVENTIONS:  - Assess patient's baseline mobility status (ambulation, transfers, stairs, etc )    - Identify cognitive and physical deficits and behaviors that affect mobility  - Identify mobility aids required to assist with transfers and/or ambulation (gait belt, sit-to-stand, lift, walker, cane, etc )  - [place] fall precautions as indicated by assessment  - Record patient progress and toleration of activity level on Mobility SBAR; progress patient to next Phase/Stage  - Instruct patient to call for assistance with activity based on assessment  - Consider rehabilitation consult to assist with strengthening/weightbearing, etc   Outcome: Progressing     Problem: DISCHARGE PLANNING  Goal: Discharge to home or other facility with appropriate resources  Description: INTERVENTIONS:  - Identify barriers to discharge w/patient and caregiver  - Arrange for needed discharge resources and transportation as appropriate  - Identify discharge learning needs (meds, wound care, etc )  - Arrange for interpretive services to assist at discharge as needed  - Refer to Case Management Department for coordinating discharge planning if the patient needs post-hospital services based on physician/advanced practitioner order or complex needs related to functional status, cognitive ability, or social support system  Outcome: Progressing     Problem: Knowledge Deficit  Goal: Patient/family/caregiver demonstrates understanding of disease process, treatment plan, medications, and discharge instructions  Description: Complete learning assessment and assess knowledge base    Interventions:  - Provide teaching at level of understanding  - Provide teaching via preferred learning methods  Outcome: Progressing     Problem: RESPIRATORY - ADULT  Goal: Achieves optimal ventilation and oxygenation  Description: INTERVENTIONS:  - Assess for changes in respiratory status  - Assess for changes in mentation and behavior  - Position to facilitate oxygenation and minimize respiratory effort  - Oxygen administered by appropriate delivery if ordered  - Initiate smoking cessation education as indicated  - Encourage broncho-pulmonary hygiene including cough, deep breathe, Incentive Spirometry  - Assess the need for suctioning and aspirate as needed  - Assess and instruct to report SOB or any respiratory difficulty  - Respiratory Therapy support as indicated  Outcome: Progressing

## 2021-02-22 NOTE — DISCHARGE INSTRUCTIONS

## 2021-02-22 NOTE — DISCHARGE INSTR - AVS FIRST PAGE
Follow up with PCP in 1 week  Hold statin (cholesterol medication) for now  Check CMP in 1-2 weeks and follow up with PCP  Come back to ER if symptoms worsen or recur

## 2021-02-22 NOTE — CASE MANAGEMENT
CM spoke to pt via phone-he is Covid +  He informs CM that he lives with his wife Morena Bravo in a one story house with 1 OCHOA  Pt is able to navigate steps and is independent with ADL's  He uses a cane to ambulate  He has never been in rehab or used [facility] services  Denies substance abuse or mental health issues  He quit smoking 50+ years ago  His PCP is Dr Alexandria Allan  He uses [place] and has no problem with co-pays  His HCR is wife Morena Bravo  He is retired, but still drives  His wife will transport home  Pt to be discharged home today  Pt does not feel he will need  services-his wife is a RN  CM will continue to follow  CM reviewed discharge planning process including the following: identifying help at home, patient preference for discharge planning needs, pharmacy preference, and availability of treatment team to discuss questions or concerns patient and/or family may have regarding understanding medications and recognizing signs and symptoms once discharged  CM also encouraged patient to follow up with all recommended appointments after discharge  Patient advised of importance for patient and family to participate in managing patients medical well being

## 2021-02-23 NOTE — ASSESSMENT & PLAN NOTE
Patient presents with fatigue, fevers, and diarrhea x1 week  · Tested positive on 02/19/2021  Mild COVID pathway as below   Initially he was mild hypoxic, but since admission he never needed any oxygen  Started on remdesivir  Received 3 doses  Never started on steroid  Currently he denies any chest pain, shortness of breath, afebrile  clinically stable to be discharged home    D-dimer was negative

## 2021-02-23 NOTE — ASSESSMENT & PLAN NOTE
· Likely from Covid 19  · Denies any abdominal pain, nausea or vomiting  Hold statin      · recheck LFT in 1-2 weeks and follow up with PCP

## 2021-02-23 NOTE — DISCHARGE SUMMARY
Discharge- Ned Ayala 1945, 76 y o  male MRN: [id number]    Unit/Bed#:  Encounter: [id number]    Primary Care Provider: Freida Bal   Date and time admitted to hospital: 2/19/2021  9:22 PM        Transaminitis  Assessment & Plan  · Likely from Covid 19  · Denies any abdominal pain, nausea or vomiting  Hold statin  · recheck LFT in 1-2 weeks and follow up with PCP    Type 2 diabetes mellitus, without long-term current use of insulin [facility])  Assessment & Plan  Lab Results   Component Value Date    HGBA1C 6 0 (H) 02/20/2021       Recent Labs     02/21/21  1537 02/21/21  2102 02/22/21  0715 02/22/21  1159   POCGLU 197* 112 131 205*       Blood Sugar Average: Last 72 hrs:  (P) 146 6   Resume home medication    Hypothyroidism due to Hashimoto's thyroiditis  Assessment & Plan  · Continue levothyroxine    Hyperlipidemia, mixed  Assessment & Plan  · Will hold statin because of elevated LFT  * Pneumonia due to COVID-19 virus  Assessment & Plan  Patient presents with fatigue, fevers, and diarrhea x1 week  · Tested positive on 02/19/2021  Mild COVID pathway as below   Initially he was mild hypoxic, but since admission he never needed any oxygen  Started on remdesivir  Received 3 doses  Never started on steroid  Currently he denies any chest pain, shortness of breath, afebrile  clinically stable to be discharged home    D-dimer was negative          Discharging Physician / Practitioner: Osmar Bowling MD  PCP: Freida Bal  Admission Date:   Admission Orders (From admission, onward)     Ordered        02/20/21 0236  Inpatient Admission  Once                   Discharge Date: 02/22/21    Resolved Problems  Date Reviewed: 2/20/2021    None          Consultations During Hospital Stay:  ·     Procedures Performed:   · CTA chest    Significant Findings / Test Results:   · CTA chest  IMPRESSION:     No evidence of acute pulmonary embolus      Multifocal areas of groundglass and consolidative opacity in the left lingula and lower lobe which may reflect viral pneumonia in the setting of Covid 19  Incidental Findings:   ·      Test Results Pending at Discharge (will require follow up):   ·      Outpatient Tests Requested:  · CMP in 1 week and follow up with PCP    Complications:      Reason for Admission:  Flu symptoms    Hospital Course:     Jm Chaney is a 76 y o  male patient who originally presented to the hospital on 2/19/2021 due to flu symptoms  Found to have covid 19 positive  Placed on mild pathway  He was required oxygen for brief period of time  Started on remdisivir  Received 3 doses  Tolerated  He is afebrile  No shortness of breath or chest pain  On room air for last more than 48 hours  Wants to go home  Clinically stable to be discharged home  Will hold statin and recheck CMP in 1 weeks and follow up with PCP  Please see above list of diagnoses and related plan for additional information  Condition at Discharge: good     Discharge Day Visit / Exam:     Subjective:    Vitals: Blood Pressure: 130/73 (02/22/21 0712)  Pulse: 58 (02/22/21 0712)  Temperature: (!) 96 6 °F (35 9 °C) (02/22/21 0712)  Temp Source: Axillary (02/22/21 2221)  Respirations: 18 (02/22/21 0000)  Height: 5' 11" (180 3 cm) (02/20/21 0423)  Weight - Scale: 96 9 kg (213 lb 10 oz) (02/20/21 0423)  SpO2: 95 % (02/22/21 1784)  Exam:   Physical Exam  Limited due to pandemic COVID-19 infection  General- Awake, alert and oriented x 3, looks comfortable  Respiratory system- breathing on room air, not using any accessory muscles  Psych- No acute psychosis  CNS- moving all extremities  Discussion with Family:     Discharge instructions/Information to patient and family:   See after visit summary for information provided to patient and family  Provisions for Follow-Up Care:  See after visit summary for information related to follow-up care and any pertinent home health orders  Disposition:     Home    For Discharges to [facility] SNF:   · Not Applicable to this Patient - Not Applicable to this Patient    Planned Readmission:      Discharge Statement:  I spent 28 minutes discharging the patient  This time was spent on the day of discharge  I had direct contact with the patient on the day of discharge  Greater than 50% of the total time was spent examining patient, answering all patient questions, arranging and discussing plan of care with patient as well as directly providing post-discharge instructions  Additional time then spent on discharge activities  Discharge Medications:  See after visit summary for reconciled discharge medications provided to patient and family        ** Please Note: This note has been constructed using a voice recognition system **

## 2021-02-23 NOTE — ASSESSMENT & PLAN NOTE
Lab Results   Component Value Date    HGBA1C 6 0 (H) 02/20/2021       Recent Labs     02/21/21  1537 02/21/21  2102 02/22/21  0715 02/22/21  1159   POCGLU 197* 112 131 205*       Blood Sugar Average: Last 72 hrs:  (P) 146 6   Resume home medication

## 2021-02-24 ENCOUNTER — TRANSITIONAL CARE MANAGEMENT (OUTPATIENT)
Dept: FAMILY MEDICINE CLINIC | Facility: CLINIC | Age: 76
End: 2021-02-24

## 2021-02-25 LAB
BACTERIA BLD CULT: NORMAL
BACTERIA BLD CULT: NORMAL

## 2021-03-08 ENCOUNTER — LAB (OUTPATIENT)
Dept: LAB | Facility: CLINIC | Age: 76
End: 2021-03-08
Payer: MEDICARE

## 2021-03-08 DIAGNOSIS — R74.01 TRANSAMINITIS: ICD-10-CM

## 2021-03-08 DIAGNOSIS — E05.00 GRAVES DISEASE: ICD-10-CM

## 2021-03-08 DIAGNOSIS — E78.2 HYPERLIPIDEMIA, MIXED: ICD-10-CM

## 2021-03-08 DIAGNOSIS — E06.3 HYPOTHYROIDISM DUE TO HASHIMOTO'S THYROIDITIS: ICD-10-CM

## 2021-03-08 DIAGNOSIS — E03.8 HYPOTHYROIDISM DUE TO HASHIMOTO'S THYROIDITIS: ICD-10-CM

## 2021-03-08 LAB
ALBUMIN SERPL BCP-MCNC: 3.8 G/DL (ref 3.5–5)
ALP SERPL-CCNC: 101 U/L (ref 46–116)
ALT SERPL W P-5'-P-CCNC: 55 U/L (ref 12–78)
ANION GAP SERPL CALCULATED.3IONS-SCNC: 5 MMOL/L (ref 4–13)
AST SERPL W P-5'-P-CCNC: 20 U/L (ref 5–45)
BILIRUB SERPL-MCNC: 0.68 MG/DL (ref 0.2–1)
BUN SERPL-MCNC: 28 MG/DL (ref 5–25)
CALCIUM SERPL-MCNC: 8.8 MG/DL (ref 8.3–10.1)
CHLORIDE SERPL-SCNC: 106 MMOL/L (ref 100–108)
CO2 SERPL-SCNC: 29 MMOL/L (ref 21–32)
CREAT SERPL-MCNC: 1.11 MG/DL (ref 0.6–1.3)
GFR SERPL CREATININE-BSD FRML MDRD: 65 ML/MIN/1.73SQ M
GLUCOSE P FAST SERPL-MCNC: 124 MG/DL (ref 65–99)
POTASSIUM SERPL-SCNC: 4.2 MMOL/L (ref 3.5–5.3)
PROT SERPL-MCNC: 7.1 G/DL (ref 6.4–8.2)
SODIUM SERPL-SCNC: 140 MMOL/L (ref 136–145)

## 2021-03-08 PROCEDURE — 36415 COLL VENOUS BLD VENIPUNCTURE: CPT

## 2021-03-08 PROCEDURE — 80053 COMPREHEN METABOLIC PANEL: CPT

## 2022-10-12 PROBLEM — U07.1 PNEUMONIA DUE TO COVID-19 VIRUS: Status: RESOLVED | Noted: 2021-02-20 | Resolved: 2022-10-12

## 2022-10-12 PROBLEM — J12.82 PNEUMONIA DUE TO COVID-19 VIRUS: Status: RESOLVED | Noted: 2021-02-20 | Resolved: 2022-10-12

## 2024-01-09 ENCOUNTER — APPOINTMENT (OUTPATIENT)
Dept: LAB | Facility: CLINIC | Age: 79
End: 2024-01-09
Payer: MEDICARE

## 2024-01-09 DIAGNOSIS — R53.83 OTHER FATIGUE: ICD-10-CM

## 2024-01-09 DIAGNOSIS — E13.69 OTHER SPECIFIED DIABETES MELLITUS WITH OTHER SPECIFIED COMPLICATION, UNSPECIFIED WHETHER LONG TERM INSULIN USE (HCC): ICD-10-CM

## 2024-01-09 DIAGNOSIS — E78.2 MIXED HYPERLIPIDEMIA: ICD-10-CM

## 2024-01-09 DIAGNOSIS — R35.1 NOCTURIA: ICD-10-CM

## 2024-01-09 DIAGNOSIS — G25.0 BENIGN ESSENTIAL TREMOR: ICD-10-CM

## 2024-01-09 LAB
ALBUMIN SERPL BCP-MCNC: 4.4 G/DL (ref 3.5–5)
ALP SERPL-CCNC: 81 U/L (ref 34–104)
ALT SERPL W P-5'-P-CCNC: 48 U/L (ref 7–52)
ANION GAP SERPL CALCULATED.3IONS-SCNC: 10 MMOL/L
AST SERPL W P-5'-P-CCNC: 29 U/L (ref 13–39)
BASOPHILS # BLD AUTO: 0.04 THOUSANDS/ÂΜL (ref 0–0.1)
BASOPHILS NFR BLD AUTO: 1 % (ref 0–1)
BILIRUB SERPL-MCNC: 0.47 MG/DL (ref 0.2–1)
BUN SERPL-MCNC: 23 MG/DL (ref 5–25)
CALCIUM SERPL-MCNC: 9 MG/DL (ref 8.4–10.2)
CHLORIDE SERPL-SCNC: 105 MMOL/L (ref 96–108)
CHOLEST SERPL-MCNC: 162 MG/DL
CO2 SERPL-SCNC: 25 MMOL/L (ref 21–32)
CREAT SERPL-MCNC: 1.14 MG/DL (ref 0.6–1.3)
EOSINOPHIL # BLD AUTO: 0.14 THOUSAND/ÂΜL (ref 0–0.61)
EOSINOPHIL NFR BLD AUTO: 2 % (ref 0–6)
ERYTHROCYTE [DISTWIDTH] IN BLOOD BY AUTOMATED COUNT: 12.4 % (ref 11.6–15.1)
EST. AVERAGE GLUCOSE BLD GHB EST-MCNC: 154 MG/DL
GFR SERPL CREATININE-BSD FRML MDRD: 61 ML/MIN/1.73SQ M
GLUCOSE P FAST SERPL-MCNC: 132 MG/DL (ref 65–99)
HBA1C MFR BLD: 7 %
HCT VFR BLD AUTO: 46.9 % (ref 36.5–49.3)
HDLC SERPL-MCNC: 43 MG/DL
HGB BLD-MCNC: 15.2 G/DL (ref 12–17)
IMM GRANULOCYTES # BLD AUTO: 0.02 THOUSAND/UL (ref 0–0.2)
IMM GRANULOCYTES NFR BLD AUTO: 0 % (ref 0–2)
LDLC SERPL CALC-MCNC: 77 MG/DL (ref 0–100)
LYMPHOCYTES # BLD AUTO: 1.74 THOUSANDS/ÂΜL (ref 0.6–4.47)
LYMPHOCYTES NFR BLD AUTO: 29 % (ref 14–44)
MCH RBC QN AUTO: 31.1 PG (ref 26.8–34.3)
MCHC RBC AUTO-ENTMCNC: 32.4 G/DL (ref 31.4–37.4)
MCV RBC AUTO: 96 FL (ref 82–98)
MONOCYTES # BLD AUTO: 0.65 THOUSAND/ÂΜL (ref 0.17–1.22)
MONOCYTES NFR BLD AUTO: 11 % (ref 4–12)
NEUTROPHILS # BLD AUTO: 3.45 THOUSANDS/ÂΜL (ref 1.85–7.62)
NEUTS SEG NFR BLD AUTO: 57 % (ref 43–75)
NONHDLC SERPL-MCNC: 119 MG/DL
NRBC BLD AUTO-RTO: 0 /100 WBCS
PLATELET # BLD AUTO: 179 THOUSANDS/UL (ref 149–390)
PMV BLD AUTO: 9.5 FL (ref 8.9–12.7)
POTASSIUM SERPL-SCNC: 4.8 MMOL/L (ref 3.5–5.3)
PROT SERPL-MCNC: 7.2 G/DL (ref 6.4–8.4)
PSA SERPL-MCNC: 0.29 NG/ML (ref 0–4)
RBC # BLD AUTO: 4.89 MILLION/UL (ref 3.88–5.62)
SODIUM SERPL-SCNC: 140 MMOL/L (ref 135–147)
TRIGL SERPL-MCNC: 209 MG/DL
TSH SERPL DL<=0.05 MIU/L-ACNC: 0.67 UIU/ML (ref 0.45–4.5)
WBC # BLD AUTO: 6.04 THOUSAND/UL (ref 4.31–10.16)

## 2024-01-09 PROCEDURE — 84443 ASSAY THYROID STIM HORMONE: CPT

## 2024-01-09 PROCEDURE — 80053 COMPREHEN METABOLIC PANEL: CPT

## 2024-01-09 PROCEDURE — 85025 COMPLETE CBC W/AUTO DIFF WBC: CPT

## 2024-01-09 PROCEDURE — G0103 PSA SCREENING: HCPCS

## 2024-01-09 PROCEDURE — 80061 LIPID PANEL: CPT

## 2024-01-09 PROCEDURE — 83036 HEMOGLOBIN GLYCOSYLATED A1C: CPT

## 2024-01-09 PROCEDURE — 36415 COLL VENOUS BLD VENIPUNCTURE: CPT

## 2024-04-16 ENCOUNTER — APPOINTMENT (OUTPATIENT)
Dept: RADIOLOGY | Facility: CLINIC | Age: 79
End: 2024-04-16
Payer: MEDICARE

## 2024-04-16 ENCOUNTER — OFFICE VISIT (OUTPATIENT)
Dept: OBGYN CLINIC | Facility: CLINIC | Age: 79
End: 2024-04-16
Payer: MEDICARE

## 2024-04-16 VITALS
DIASTOLIC BLOOD PRESSURE: 81 MMHG | HEART RATE: 69 BPM | WEIGHT: 225 LBS | HEIGHT: 71 IN | BODY MASS INDEX: 31.5 KG/M2 | SYSTOLIC BLOOD PRESSURE: 124 MMHG

## 2024-04-16 DIAGNOSIS — M19.011 PRIMARY OSTEOARTHRITIS OF RIGHT SHOULDER: ICD-10-CM

## 2024-04-16 DIAGNOSIS — M25.511 ACUTE PAIN OF RIGHT SHOULDER: Primary | ICD-10-CM

## 2024-04-16 DIAGNOSIS — M25.511 RIGHT SHOULDER PAIN, UNSPECIFIED CHRONICITY: ICD-10-CM

## 2024-04-16 PROCEDURE — 99204 OFFICE O/P NEW MOD 45 MIN: CPT | Performed by: ORTHOPAEDIC SURGERY

## 2024-04-16 PROCEDURE — 73030 X-RAY EXAM OF SHOULDER: CPT

## 2024-04-16 RX ORDER — ASPIRIN 81 MG/1
81 TABLET ORAL DAILY
COMMUNITY

## 2024-04-16 RX ORDER — OMEGA-3-ACID ETHYL ESTERS 1 G/1
2 CAPSULE, LIQUID FILLED ORAL EVERY 12 HOURS
COMMUNITY
Start: 2024-03-11

## 2024-04-16 RX ORDER — ROSUVASTATIN CALCIUM 5 MG/1
TABLET, COATED ORAL
COMMUNITY
Start: 2024-04-01

## 2024-04-16 RX ORDER — TAMSULOSIN HYDROCHLORIDE 0.4 MG/1
CAPSULE ORAL
COMMUNITY

## 2024-04-16 RX ORDER — EMPAGLIFLOZIN 10 MG/1
10 TABLET, FILM COATED ORAL DAILY
COMMUNITY
Start: 2024-03-02

## 2024-04-16 NOTE — PROGRESS NOTES
Patient Name:  Wero Mccormack  MRN:  [id number]    Assessment & Plan     1. Acute pain of right shoulder  -     XR shoulder 2+ vw right; Future; Expected date: 04/16/2024  -     Ambulatory Referral to Physical Therapy; Future    2. Primary osteoarthritis of right shoulder  -     Ambulatory Referral to Physical Therapy; Future        79 y.o. male with Right shoulder osteoarthritis.   X-rays reviewed in office today with patient.   Treatment options were discussed including oral and topical medications, corticosteroid injections, physical therapy  OTC analgesics, voltaren gel as needed for symptom management.   A script was provided for outpatient physical therapy  The patient declined corticosteroid injection today  He will follow up as needed    Chief Complaint     Right shoulder pain    History of the Present Illness     Wero Mccormack is a RHD 79 y.o. male with Right shoulder pain for several years. He has pain in his posterior shoulder with most activities that involve moving his arm. Today he admits to feeling better than normal. He has never had previous treatment for his shoulders. The patient has a tremor that he admits to having for years. He is taking medication for this. He has difficulty while getting dressed due to pain. The patient notices cracking and popping in his shoulder. He uses Australian Dream cream to manage symptoms.    Review of Systems     Review of Systems   Constitutional:  Negative for chills and fever.   HENT:  Negative for ear pain and sore throat.    Eyes:  Negative for pain and visual disturbance.   Respiratory:  Negative for cough and shortness of breath.    Cardiovascular:  Negative for chest pain and palpitations.   Gastrointestinal:  Negative for abdominal pain and vomiting.   Genitourinary:  Negative for dysuria and hematuria.   Musculoskeletal:  Negative for arthralgias and back pain.   Skin:  Negative for color change and rash.   Neurological:  Negative for seizures and syncope.  "  All other systems reviewed and are negative.      Physical Exam     /81   Pulse 69   Ht 5' 11\" (1.803 m)   Wt 102 kg (225 lb)   BMI 31.38 kg/m²     Right Shoulder:   Active range of motion   130 degrees forward flexion  150 degrees abduction  40 degrees external rotation   SI joint internal rotation    Passive range of motion   140 degrees of forward flexion     There is no tenderness present over the shoulder.   Supraspinatus testing 4+/5  Infraspinatus testing 4/5  Subscapularis testing 5/5  Arrington test is negative   The patient is neurovascularly intact distally in the extremity.      Eyes:  Anicteric sclerae.  Neck:  Supple.  Lungs:  Normal respiratory effort.  Cardiovascular:  Capillary refill is less than 2 seconds.  Skin:  Intact without erythema.  Neurologic:  Sensation grossly intact to light touch.  Psychiatric:  Mood and affect are appropriate.    Data Review     I have personally reviewed pertinent films in PACS, and my interpretation follows:    X-rays taken 04/16/2024 of Right shoulder demonstrate degenerative changes at inferior glenohumeral joint. No acute fracture or dislocation.    Past Medical History:   Diagnosis Date    Anxiety     Coronary artery disease     Depression     Diabetes mellitus (HCC)     Graves disease     Hyperlipidemia     Hypothyroidism     Myopathy     Neuropathy        Past Surgical History:   Procedure Laterality Date    CORONARY ANGIOPLASTY WITH STENT PLACEMENT         Allergies   Allergen Reactions    Penicillin G Anaphylaxis     Other reaction(s): anaphalaxis shock       Current Outpatient Medications on File Prior to Visit   Medication Sig Dispense Refill    ALPRAZolam (XANAX) 0.25 mg tablet Take 0.25 mg by mouth PRN      DULoxetine (CYMBALTA) 60 mg delayed release capsule Take 60 mg by mouth daily      glyBURIDE-metFORMIN (GLUCOVANCE) 2.5-500 MG per tablet Take 2 tablets by mouth      Jardiance 10 MG TABS tablet Take 10 mg by mouth daily      levothyroxine " (Synthroid) 150 mcg tablet Take 150 mcg by mouth      omega-3-acid ethyl esters (LOVAZA) 1 g capsule Take 2 capsules by mouth every 12 (twelve) hours      propranolol (INDERAL) 10 mg tablet TAKE ONE (1) TABLET (10 MG TOTAL) BY MOUTH TWICE DAILY 60 tablet 0    rosuvastatin (CRESTOR) 5 mg tablet TAKE 1 TABLET (5 MG) DAILY BY MOUTH      tamsulosin (FLOMAX) 0.4 mg TAKE 1 CAPSULE (0.4 MG) EVERY MORNING BY MOUTH      albuterol (PROVENTIL HFA,VENTOLIN HFA) 90 mcg/act inhaler Inhale 2 puffs every 4 (four) hours as needed for wheezing (Patient not taking: Reported on 4/16/2024) 1 Inhaler 0    ascorbic acid (VITAMIN C) 1000 MG tablet Take 1 tablet (1,000 mg total) by mouth every 12 (twelve) hours for 5 days 9 tablet 0    aspirin (ECOTRIN LOW STRENGTH) 81 mg EC tablet Take 81 mg by mouth daily      cholecalciferol (VITAMIN D3) 1,000 units tablet Take 2 tablets (2,000 Units total) by mouth daily for 10 days 20 tablet 0    methylPREDNISolone 4 MG tablet therapy pack Use as directed on package (Patient not taking: Reported on 4/16/2024) 21 tablet 0    multivitamin-minerals (CENTRUM ADULTS) tablet Take 1 tablet by mouth daily 30 tablet 0    primidone (MYSOLINE) 250 mg tablet Take 1 tablet (250 mg total) by mouth every 8 (eight) hours 270 tablet 3    [DISCONTINUED] benzonatate (TESSALON PERLES) 100 mg capsule Take 1 capsule (100 mg total) by mouth 3 (three) times a day as needed for cough 20 capsule 0     No current facility-administered medications on file prior to visit.       Social History     Tobacco Use    Smoking status: Former     Current packs/day: 0.25     Average packs/day: 0.3 packs/day for 5.0 years (1.3 ttl pk-yrs)     Types: Cigarettes    Smokeless tobacco: Never   Vaping Use    Vaping status: Never Used   Substance Use Topics    Alcohol use: Never    Drug use: Never       Family History   Problem Relation Age of Onset    Cancer Mother     Breast cancer Mother     Heart failure Father              Procedures  Performed     Procedures  None performed.      Maximus Sims DO  Scribe Attestation      I,:  Maria Dolores Javier am acting as a scribe while in the presence of the attending physician.:       I,:  Maximus Sims DO personally performed the services described in this documentation    as scribed in my presence.:

## 2024-08-14 ENCOUNTER — APPOINTMENT (EMERGENCY)
Dept: RADIOLOGY | Facility: HOSPITAL | Age: 79
End: 2024-08-14
Payer: MEDICARE

## 2024-08-14 ENCOUNTER — APPOINTMENT (EMERGENCY)
Dept: CT IMAGING | Facility: HOSPITAL | Age: 79
End: 2024-08-14
Payer: MEDICARE

## 2024-08-14 ENCOUNTER — HOSPITAL ENCOUNTER (OUTPATIENT)
Facility: HOSPITAL | Age: 79
Setting detail: OBSERVATION
Discharge: HOME | End: 2024-08-15
Attending: EMERGENCY MEDICINE | Admitting: INTERNAL MEDICINE
Payer: MEDICARE

## 2024-08-14 DIAGNOSIS — R55 COUGH SYNCOPE: ICD-10-CM

## 2024-08-14 DIAGNOSIS — R05.4 COUGH SYNCOPE: ICD-10-CM

## 2024-08-14 DIAGNOSIS — S09.90XA CLOSED HEAD INJURY, INITIAL ENCOUNTER: ICD-10-CM

## 2024-08-14 DIAGNOSIS — R55 SYNCOPE: Primary | ICD-10-CM

## 2024-08-14 DIAGNOSIS — R05.2 SUBACUTE COUGH: ICD-10-CM

## 2024-08-14 LAB
2HR DELTA HS TROPONIN: -1 NG/L
ABO GROUP BLD: NORMAL
ALBUMIN SERPL BCG-MCNC: 4.5 G/DL (ref 3.5–5)
ALP SERPL-CCNC: 88 U/L (ref 34–104)
ALT SERPL W P-5'-P-CCNC: 32 U/L (ref 7–52)
ANION GAP SERPL CALCULATED.3IONS-SCNC: 8 MMOL/L (ref 4–13)
AST SERPL W P-5'-P-CCNC: 26 U/L (ref 13–39)
BASOPHILS # BLD AUTO: 0.05 THOUSANDS/ÂΜL (ref 0–0.1)
BASOPHILS NFR BLD AUTO: 0 % (ref 0–1)
BILIRUB SERPL-MCNC: 0.45 MG/DL (ref 0.2–1)
BLD GP AB SCN SERPL QL: NEGATIVE
BUN SERPL-MCNC: 26 MG/DL (ref 5–25)
CALCIUM SERPL-MCNC: 9.3 MG/DL (ref 8.4–10.2)
CARDIAC TROPONIN I PNL SERPL HS: 3 NG/L
CARDIAC TROPONIN I PNL SERPL HS: 4 NG/L
CHLORIDE SERPL-SCNC: 100 MMOL/L (ref 96–108)
CO2 SERPL-SCNC: 29 MMOL/L (ref 21–32)
CREAT SERPL-MCNC: 1.24 MG/DL (ref 0.6–1.3)
EOSINOPHIL # BLD AUTO: 0.24 THOUSAND/ÂΜL (ref 0–0.61)
EOSINOPHIL NFR BLD AUTO: 2 % (ref 0–6)
ERYTHROCYTE [DISTWIDTH] IN BLOOD BY AUTOMATED COUNT: 12.4 % (ref 11.6–15.1)
GFR SERPL CREATININE-BSD FRML MDRD: 54 ML/MIN/1.73SQ M
GLUCOSE SERPL-MCNC: 166 MG/DL (ref 65–140)
HCT VFR BLD AUTO: 47.7 % (ref 36.5–49.3)
HGB BLD-MCNC: 16.5 G/DL (ref 12–17)
IMM GRANULOCYTES # BLD AUTO: 0.05 THOUSAND/UL (ref 0–0.2)
IMM GRANULOCYTES NFR BLD AUTO: 0 % (ref 0–2)
LACTATE SERPL-SCNC: 1.6 MMOL/L (ref 0.5–2)
LYMPHOCYTES # BLD AUTO: 2.08 THOUSANDS/ÂΜL (ref 0.6–4.47)
LYMPHOCYTES NFR BLD AUTO: 18 % (ref 14–44)
MCH RBC QN AUTO: 32.9 PG (ref 26.8–34.3)
MCHC RBC AUTO-ENTMCNC: 34.6 G/DL (ref 31.4–37.4)
MCV RBC AUTO: 95 FL (ref 82–98)
MONOCYTES # BLD AUTO: 1.14 THOUSAND/ÂΜL (ref 0.17–1.22)
MONOCYTES NFR BLD AUTO: 10 % (ref 4–12)
NEUTROPHILS # BLD AUTO: 8.13 THOUSANDS/ÂΜL (ref 1.85–7.62)
NEUTS SEG NFR BLD AUTO: 70 % (ref 43–75)
NRBC BLD AUTO-RTO: 0 /100 WBCS
PLATELET # BLD AUTO: 167 THOUSANDS/UL (ref 149–390)
PLATELET # BLD AUTO: 205 THOUSANDS/UL (ref 149–390)
PMV BLD AUTO: 8.6 FL (ref 8.9–12.7)
PMV BLD AUTO: 9.4 FL (ref 8.9–12.7)
POTASSIUM SERPL-SCNC: 4.8 MMOL/L (ref 3.5–5.3)
PROCALCITONIN SERPL-MCNC: <0.05 NG/ML
PROT SERPL-MCNC: 7.6 G/DL (ref 6.4–8.4)
RBC # BLD AUTO: 5.02 MILLION/UL (ref 3.88–5.62)
RH BLD: POSITIVE
SODIUM SERPL-SCNC: 137 MMOL/L (ref 135–147)
SPECIMEN EXPIRATION DATE: NORMAL
WBC # BLD AUTO: 11.69 THOUSAND/UL (ref 4.31–10.16)

## 2024-08-14 PROCEDURE — 83036 HEMOGLOBIN GLYCOSYLATED A1C: CPT | Performed by: INTERNAL MEDICINE

## 2024-08-14 PROCEDURE — 96361 HYDRATE IV INFUSION ADD-ON: CPT

## 2024-08-14 PROCEDURE — 96365 THER/PROPH/DIAG IV INF INIT: CPT

## 2024-08-14 PROCEDURE — 36415 COLL VENOUS BLD VENIPUNCTURE: CPT | Performed by: EMERGENCY MEDICINE

## 2024-08-14 PROCEDURE — 70450 CT HEAD/BRAIN W/O DYE: CPT

## 2024-08-14 PROCEDURE — 84484 ASSAY OF TROPONIN QUANT: CPT | Performed by: EMERGENCY MEDICINE

## 2024-08-14 PROCEDURE — 86850 RBC ANTIBODY SCREEN: CPT | Performed by: EMERGENCY MEDICINE

## 2024-08-14 PROCEDURE — 85049 AUTOMATED PLATELET COUNT: CPT | Performed by: INTERNAL MEDICINE

## 2024-08-14 PROCEDURE — 84145 PROCALCITONIN (PCT): CPT | Performed by: INTERNAL MEDICINE

## 2024-08-14 PROCEDURE — 93005 ELECTROCARDIOGRAM TRACING: CPT

## 2024-08-14 PROCEDURE — 72125 CT NECK SPINE W/O DYE: CPT

## 2024-08-14 PROCEDURE — 96375 TX/PRO/DX INJ NEW DRUG ADDON: CPT

## 2024-08-14 PROCEDURE — 86901 BLOOD TYPING SEROLOGIC RH(D): CPT | Performed by: EMERGENCY MEDICINE

## 2024-08-14 PROCEDURE — 84484 ASSAY OF TROPONIN QUANT: CPT | Performed by: INTERNAL MEDICINE

## 2024-08-14 PROCEDURE — 99285 EMERGENCY DEPT VISIT HI MDM: CPT | Performed by: EMERGENCY MEDICINE

## 2024-08-14 PROCEDURE — 74177 CT ABD & PELVIS W/CONTRAST: CPT

## 2024-08-14 PROCEDURE — 83605 ASSAY OF LACTIC ACID: CPT | Performed by: INTERNAL MEDICINE

## 2024-08-14 PROCEDURE — 71045 X-RAY EXAM CHEST 1 VIEW: CPT

## 2024-08-14 PROCEDURE — 71260 CT THORAX DX C+: CPT

## 2024-08-14 PROCEDURE — 80053 COMPREHEN METABOLIC PANEL: CPT | Performed by: EMERGENCY MEDICINE

## 2024-08-14 PROCEDURE — 85025 COMPLETE CBC W/AUTO DIFF WBC: CPT | Performed by: EMERGENCY MEDICINE

## 2024-08-14 PROCEDURE — 99285 EMERGENCY DEPT VISIT HI MDM: CPT

## 2024-08-14 PROCEDURE — 86900 BLOOD TYPING SEROLOGIC ABO: CPT | Performed by: EMERGENCY MEDICINE

## 2024-08-14 RX ORDER — LEVOTHYROXINE SODIUM 150 UG/1
150 TABLET ORAL
Status: DISCONTINUED | OUTPATIENT
Start: 2024-08-15 | End: 2024-08-15 | Stop reason: HOSPADM

## 2024-08-14 RX ORDER — MORPHINE SULFATE 4 MG/ML
4 INJECTION, SOLUTION INTRAMUSCULAR; INTRAVENOUS ONCE
Status: COMPLETED | OUTPATIENT
Start: 2024-08-14 | End: 2024-08-14

## 2024-08-14 RX ORDER — ALPRAZOLAM 0.25 MG
0.25 TABLET ORAL DAILY
Status: DISCONTINUED | OUTPATIENT
Start: 2024-08-15 | End: 2024-08-14

## 2024-08-14 RX ORDER — DULOXETIN HYDROCHLORIDE 60 MG/1
60 CAPSULE, DELAYED RELEASE ORAL DAILY
Status: DISCONTINUED | OUTPATIENT
Start: 2024-08-15 | End: 2024-08-15 | Stop reason: HOSPADM

## 2024-08-14 RX ORDER — ALPRAZOLAM 0.25 MG
0.25 TABLET ORAL DAILY PRN
Status: DISCONTINUED | OUTPATIENT
Start: 2024-08-14 | End: 2024-08-15 | Stop reason: HOSPADM

## 2024-08-14 RX ORDER — PRAVASTATIN SODIUM 40 MG
40 TABLET ORAL
Status: DISCONTINUED | OUTPATIENT
Start: 2024-08-15 | End: 2024-08-15 | Stop reason: HOSPADM

## 2024-08-14 RX ORDER — ACETAMINOPHEN 10 MG/ML
1000 INJECTION, SOLUTION INTRAVENOUS ONCE
Status: COMPLETED | OUTPATIENT
Start: 2024-08-14 | End: 2024-08-14

## 2024-08-14 RX ORDER — PRIMIDONE 50 MG/1
250 TABLET ORAL EVERY 8 HOURS SCHEDULED
Status: DISCONTINUED | OUTPATIENT
Start: 2024-08-14 | End: 2024-08-15 | Stop reason: HOSPADM

## 2024-08-14 RX ORDER — TAMSULOSIN HYDROCHLORIDE 0.4 MG/1
0.4 CAPSULE ORAL
Status: DISCONTINUED | OUTPATIENT
Start: 2024-08-15 | End: 2024-08-15 | Stop reason: HOSPADM

## 2024-08-14 RX ORDER — HEPARIN SODIUM 5000 [USP'U]/ML
5000 INJECTION, SOLUTION INTRAVENOUS; SUBCUTANEOUS EVERY 8 HOURS SCHEDULED
Status: DISCONTINUED | OUTPATIENT
Start: 2024-08-14 | End: 2024-08-15 | Stop reason: HOSPADM

## 2024-08-14 RX ORDER — INSULIN LISPRO 100 [IU]/ML
4-20 INJECTION, SOLUTION INTRAVENOUS; SUBCUTANEOUS
Status: DISCONTINUED | OUTPATIENT
Start: 2024-08-15 | End: 2024-08-15 | Stop reason: HOSPADM

## 2024-08-14 RX ADMIN — ACETAMINOPHEN 1000 MG: 10 INJECTION INTRAVENOUS at 20:53

## 2024-08-14 RX ADMIN — PRIMIDONE 250 MG: 50 TABLET ORAL at 23:53

## 2024-08-14 RX ADMIN — IOHEXOL 100 ML: 350 INJECTION, SOLUTION INTRAVENOUS at 21:01

## 2024-08-14 RX ADMIN — MORPHINE SULFATE 4 MG: 4 INJECTION INTRAVENOUS at 20:52

## 2024-08-14 RX ADMIN — SODIUM CHLORIDE 1000 ML: 0.9 INJECTION, SOLUTION INTRAVENOUS at 20:50

## 2024-08-15 VITALS
OXYGEN SATURATION: 95 % | BODY MASS INDEX: 32.13 KG/M2 | RESPIRATION RATE: 16 BRPM | SYSTOLIC BLOOD PRESSURE: 140 MMHG | TEMPERATURE: 98.5 F | HEART RATE: 80 BPM | WEIGHT: 230.38 LBS | DIASTOLIC BLOOD PRESSURE: 76 MMHG

## 2024-08-15 PROBLEM — R05.2 SUBACUTE COUGH: Status: ACTIVE | Noted: 2024-08-15

## 2024-08-15 PROBLEM — R55 COUGH SYNCOPE: Status: ACTIVE | Noted: 2024-08-15

## 2024-08-15 PROBLEM — R05.4 COUGH SYNCOPE: Status: ACTIVE | Noted: 2024-08-15

## 2024-08-15 PROBLEM — N40.0 BPH (BENIGN PROSTATIC HYPERPLASIA): Chronic | Status: ACTIVE | Noted: 2024-08-15

## 2024-08-15 PROBLEM — F32.5 MAJOR DEPRESSIVE DISORDER IN FULL REMISSION (HCC): Chronic | Status: ACTIVE | Noted: 2024-08-15

## 2024-08-15 LAB
4HR DELTA HS TROPONIN: -1 NG/L
ANION GAP SERPL CALCULATED.3IONS-SCNC: 5 MMOL/L (ref 4–13)
ATRIAL RATE: 84 BPM
BACTERIA UR QL AUTO: ABNORMAL /HPF
BASOPHILS # BLD AUTO: 0.04 THOUSANDS/ÂΜL (ref 0–0.1)
BASOPHILS NFR BLD AUTO: 1 % (ref 0–1)
BILIRUB UR QL STRIP: NEGATIVE
BUN SERPL-MCNC: 24 MG/DL (ref 5–25)
CALCIUM SERPL-MCNC: 8.4 MG/DL (ref 8.4–10.2)
CARDIAC TROPONIN I PNL SERPL HS: 3 NG/L
CHLORIDE SERPL-SCNC: 104 MMOL/L (ref 96–108)
CLARITY UR: CLEAR
CO2 SERPL-SCNC: 27 MMOL/L (ref 21–32)
COLOR UR: ABNORMAL
CREAT SERPL-MCNC: 1.07 MG/DL (ref 0.6–1.3)
D DIMER PPP FEU-MCNC: <0.27 UG/ML FEU
EOSINOPHIL # BLD AUTO: 0.24 THOUSAND/ÂΜL (ref 0–0.61)
EOSINOPHIL NFR BLD AUTO: 3 % (ref 0–6)
ERYTHROCYTE [DISTWIDTH] IN BLOOD BY AUTOMATED COUNT: 12.4 % (ref 11.6–15.1)
EST. AVERAGE GLUCOSE BLD GHB EST-MCNC: 163 MG/DL
FLUAV RNA RESP QL NAA+PROBE: NEGATIVE
FLUBV RNA RESP QL NAA+PROBE: NEGATIVE
GFR SERPL CREATININE-BSD FRML MDRD: 65 ML/MIN/1.73SQ M
GLUCOSE SERPL-MCNC: 106 MG/DL (ref 65–140)
GLUCOSE SERPL-MCNC: 115 MG/DL (ref 65–140)
GLUCOSE SERPL-MCNC: 121 MG/DL (ref 65–140)
GLUCOSE SERPL-MCNC: 142 MG/DL (ref 65–140)
GLUCOSE SERPL-MCNC: 231 MG/DL (ref 65–140)
GLUCOSE UR STRIP-MCNC: ABNORMAL MG/DL
HBA1C MFR BLD: 7.3 %
HCT VFR BLD AUTO: 43 % (ref 36.5–49.3)
HGB BLD-MCNC: 14.4 G/DL (ref 12–17)
HGB UR QL STRIP.AUTO: NEGATIVE
IMM GRANULOCYTES # BLD AUTO: 0.02 THOUSAND/UL (ref 0–0.2)
IMM GRANULOCYTES NFR BLD AUTO: 0 % (ref 0–2)
KETONES UR STRIP-MCNC: NEGATIVE MG/DL
LACTATE SERPL-SCNC: 1.1 MMOL/L (ref 0.5–2)
LEUKOCYTE ESTERASE UR QL STRIP: ABNORMAL
LYMPHOCYTES # BLD AUTO: 2.06 THOUSANDS/ÂΜL (ref 0.6–4.47)
LYMPHOCYTES NFR BLD AUTO: 25 % (ref 14–44)
MCH RBC QN AUTO: 32.1 PG (ref 26.8–34.3)
MCHC RBC AUTO-ENTMCNC: 33.5 G/DL (ref 31.4–37.4)
MCV RBC AUTO: 96 FL (ref 82–98)
MONOCYTES # BLD AUTO: 1.26 THOUSAND/ÂΜL (ref 0.17–1.22)
MONOCYTES NFR BLD AUTO: 15 % (ref 4–12)
NEUTROPHILS # BLD AUTO: 4.68 THOUSANDS/ÂΜL (ref 1.85–7.62)
NEUTS SEG NFR BLD AUTO: 56 % (ref 43–75)
NITRITE UR QL STRIP: NEGATIVE
NON-SQ EPI CELLS URNS QL MICRO: ABNORMAL /HPF
NRBC BLD AUTO-RTO: 0 /100 WBCS
P AXIS: 20 DEGREES
PH UR STRIP.AUTO: 5 [PH]
PLATELET # BLD AUTO: 147 THOUSANDS/UL (ref 149–390)
PMV BLD AUTO: 8.6 FL (ref 8.9–12.7)
POTASSIUM SERPL-SCNC: 4.2 MMOL/L (ref 3.5–5.3)
PR INTERVAL: 206 MS
PROCALCITONIN SERPL-MCNC: <0.05 NG/ML
PROT UR STRIP-MCNC: NEGATIVE MG/DL
QRS AXIS: 262 DEGREES
QRSD INTERVAL: 86 MS
QT INTERVAL: 372 MS
QTC INTERVAL: 439 MS
RBC # BLD AUTO: 4.48 MILLION/UL (ref 3.88–5.62)
RBC #/AREA URNS AUTO: ABNORMAL /HPF
RSV RNA RESP QL NAA+PROBE: NEGATIVE
SARS-COV-2 RNA RESP QL NAA+PROBE: NEGATIVE
SODIUM SERPL-SCNC: 136 MMOL/L (ref 135–147)
SP GR UR STRIP.AUTO: >=1.05 (ref 1–1.03)
T WAVE AXIS: 67 DEGREES
UROBILINOGEN UR STRIP-ACNC: <2 MG/DL
VENTRICULAR RATE: 84 BPM
WBC # BLD AUTO: 8.3 THOUSAND/UL (ref 4.31–10.16)
WBC #/AREA URNS AUTO: ABNORMAL /HPF

## 2024-08-15 PROCEDURE — 0241U HB NFCT DS VIR RESP RNA 4 TRGT: CPT | Performed by: EMERGENCY MEDICINE

## 2024-08-15 PROCEDURE — 84484 ASSAY OF TROPONIN QUANT: CPT | Performed by: INTERNAL MEDICINE

## 2024-08-15 PROCEDURE — 84145 PROCALCITONIN (PCT): CPT | Performed by: INTERNAL MEDICINE

## 2024-08-15 PROCEDURE — 99236 HOSP IP/OBS SAME DATE HI 85: CPT | Performed by: STUDENT IN AN ORGANIZED HEALTH CARE EDUCATION/TRAINING PROGRAM

## 2024-08-15 PROCEDURE — 85379 FIBRIN DEGRADATION QUANT: CPT | Performed by: INTERNAL MEDICINE

## 2024-08-15 PROCEDURE — 97162 PT EVAL MOD COMPLEX 30 MIN: CPT

## 2024-08-15 PROCEDURE — 82948 REAGENT STRIP/BLOOD GLUCOSE: CPT

## 2024-08-15 PROCEDURE — 99222 1ST HOSP IP/OBS MODERATE 55: CPT | Performed by: INTERNAL MEDICINE

## 2024-08-15 PROCEDURE — 81001 URINALYSIS AUTO W/SCOPE: CPT | Performed by: INTERNAL MEDICINE

## 2024-08-15 PROCEDURE — 85025 COMPLETE CBC W/AUTO DIFF WBC: CPT | Performed by: INTERNAL MEDICINE

## 2024-08-15 PROCEDURE — 80048 BASIC METABOLIC PNL TOTAL CA: CPT | Performed by: INTERNAL MEDICINE

## 2024-08-15 PROCEDURE — 83605 ASSAY OF LACTIC ACID: CPT | Performed by: INTERNAL MEDICINE

## 2024-08-15 PROCEDURE — 97165 OT EVAL LOW COMPLEX 30 MIN: CPT

## 2024-08-15 PROCEDURE — 93010 ELECTROCARDIOGRAM REPORT: CPT | Performed by: INTERNAL MEDICINE

## 2024-08-15 RX ORDER — BUDESONIDE AND FORMOTEROL FUMARATE DIHYDRATE 80; 4.5 UG/1; UG/1
2 AEROSOL RESPIRATORY (INHALATION) 2 TIMES DAILY
Qty: 10.2 G | Refills: 0 | Status: SHIPPED | OUTPATIENT
Start: 2024-08-15 | End: 2024-08-16

## 2024-08-15 RX ORDER — ALBUTEROL SULFATE 90 UG/1
2 AEROSOL, METERED RESPIRATORY (INHALATION) EVERY 4 HOURS PRN
Status: DISCONTINUED | OUTPATIENT
Start: 2024-08-15 | End: 2024-08-15 | Stop reason: HOSPADM

## 2024-08-15 RX ORDER — BUDESONIDE AND FORMOTEROL FUMARATE DIHYDRATE 80; 4.5 UG/1; UG/1
2 AEROSOL RESPIRATORY (INHALATION) 2 TIMES DAILY
Qty: 10.2 G | Refills: 0 | Status: SHIPPED | OUTPATIENT
Start: 2024-08-15 | End: 2024-08-15

## 2024-08-15 RX ADMIN — DULOXETINE HYDROCHLORIDE 60 MG: 60 CAPSULE, DELAYED RELEASE ORAL at 09:15

## 2024-08-15 RX ADMIN — LEVOTHYROXINE SODIUM 150 MCG: 150 TABLET ORAL at 06:05

## 2024-08-15 RX ADMIN — ASPIRIN 81 MG: 81 TABLET, COATED ORAL at 09:15

## 2024-08-15 RX ADMIN — PRIMIDONE 250 MG: 50 TABLET ORAL at 14:11

## 2024-08-15 RX ADMIN — PRIMIDONE 250 MG: 50 TABLET ORAL at 06:05

## 2024-08-15 RX ADMIN — INSULIN LISPRO 8 UNITS: 100 INJECTION, SOLUTION INTRAVENOUS; SUBCUTANEOUS at 09:22

## 2024-08-15 NOTE — ED PROVIDER NOTES
History  Chief Complaint   Patient presents with   • Syncope   • Cough     Pt presents to ER from home for reports of cough x1 month, today he had an episode of coughing and went to stand up and then woke up on the ground. Might have hit head on dresser, woke up between that and a lounge chair. Reports a lump on the top/posterior of head. Had PO azithromycin, tessalon perles, albuterol nebs, denies relief of cough. Takes 81mg ASA daily.      Patient is 79-year-old male past medical history of CAD, hyperlipidemia, diabetes, Graves' disease, hypothyroid presenting with syncope and head injury.  Patient states that at roughly 4:30 PM, 4 hours ago he stood up from sitting while having a coughing fit and states coughing fit got worse when he stood and that he woke up on the ground.  He hit his head on the dresser on the way down and notes bilateral lower chest pain and upper abdominal pain since then.  Denies any lightheadedness, shortness of breath, chest pain, nausea or vomiting or increased warmth prior to passing out.  Denies any shortness of breath currently, nausea or vomiting but does note chest pain as described above.  Denies any unilateral numbness or tingling, weakness, vision changes, nausea or vomiting, dizziness.  Is on baby aspirin but no other blood thinners.  Denies any fevers at home and is being treated with Tessalon Perles, butyryl, Z-Luis for 1 month of cough intermittently productive of clear nonbloody sputum.  Denies any neck or back pain.  Denies any headaches.        Prior to Admission Medications   Prescriptions Last Dose Informant Patient Reported? Taking?   ALPRAZolam (XANAX) 0.25 mg tablet  Self Yes No   Sig: Take 0.25 mg by mouth PRN   DULoxetine (CYMBALTA) 60 mg delayed release capsule  Self Yes No   Sig: Take 60 mg by mouth daily   Jardiance 10 MG TABS tablet  Self Yes No   Sig: Take 10 mg by mouth daily   albuterol (PROVENTIL HFA,VENTOLIN HFA) 90 mcg/act inhaler  Self No No   Sig: Inhale 2  puffs every 4 (four) hours as needed for wheezing   Patient not taking: Reported on 4/16/2024   ascorbic acid (VITAMIN C) 1000 MG tablet   No No   Sig: Take 1 tablet (1,000 mg total) by mouth every 12 (twelve) hours for 5 days   aspirin (ECOTRIN LOW STRENGTH) 81 mg EC tablet  Self Yes No   Sig: Take 81 mg by mouth daily   cholecalciferol (VITAMIN D3) 1,000 units tablet   No No   Sig: Take 2 tablets (2,000 Units total) by mouth daily for 10 days   glyBURIDE-metFORMIN (GLUCOVANCE) 2.5-500 MG per tablet  Self Yes No   Sig: Take 2 tablets by mouth   levothyroxine (Synthroid) 150 mcg tablet  Self Yes No   Sig: Take 150 mcg by mouth   methylPREDNISolone 4 MG tablet therapy pack  Self No No   Sig: Use as directed on package   Patient not taking: Reported on 4/16/2024   multivitamin-minerals (CENTRUM ADULTS) tablet   No No   Sig: Take 1 tablet by mouth daily   omega-3-acid ethyl esters (LOVAZA) 1 g capsule  Self Yes No   Sig: Take 2 capsules by mouth every 12 (twelve) hours   primidone (MYSOLINE) 250 mg tablet   No No   Sig: Take 1 tablet (250 mg total) by mouth every 8 (eight) hours   propranolol (INDERAL) 10 mg tablet  Self No No   Sig: TAKE ONE (1) TABLET (10 MG TOTAL) BY MOUTH TWICE DAILY   rosuvastatin (CRESTOR) 5 mg tablet  Self Yes No   Sig: TAKE 1 TABLET (5 MG) DAILY BY MOUTH   tamsulosin (FLOMAX) 0.4 mg  Self Yes No   Sig: TAKE 1 CAPSULE (0.4 MG) EVERY MORNING BY MOUTH      Facility-Administered Medications: None       Past Medical History:   Diagnosis Date   • Anxiety    • Coronary artery disease    • Depression    • Diabetes mellitus (HCC)    • Graves disease    • Hyperlipidemia    • Hypothyroidism    • Myopathy    • Neuropathy        Past Surgical History:   Procedure Laterality Date   • CORONARY ANGIOPLASTY WITH STENT PLACEMENT         Family History   Problem Relation Age of Onset   • Cancer Mother    • Breast cancer Mother    • Heart failure Father      I have reviewed and agree with the history as  documented.    E-Cigarette/Vaping   • E-Cigarette Use Never User      E-Cigarette/Vaping Substances   • Nicotine No    • THC No    • CBD No    • Flavoring No    • Other No    • Unknown No      Social History     Tobacco Use   • Smoking status: Former     Current packs/day: 0.25     Average packs/day: 0.3 packs/day for 5.0 years (1.3 ttl pk-yrs)     Types: Cigarettes   • Smokeless tobacco: Never   Vaping Use   • Vaping status: Never Used   Substance Use Topics   • Alcohol use: Never   • Drug use: Never       Review of Systems   All other systems reviewed and are negative.      Physical Exam  Physical Exam  Vitals reviewed.   Constitutional:       General: He is not in acute distress.     Appearance: Normal appearance. He is not ill-appearing.   HENT:      Head:      Comments: Abrasion to the posterior head no active bleeding     Mouth/Throat:      Mouth: Mucous membranes are moist.   Eyes:      Extraocular Movements: Extraocular movements intact.      Conjunctiva/sclera: Conjunctivae normal.      Pupils: Pupils are equal, round, and reactive to light.   Cardiovascular:      Rate and Rhythm: Normal rate and regular rhythm.      Pulses: Normal pulses.      Heart sounds: Normal heart sounds.   Pulmonary:      Effort: Pulmonary effort is normal.      Breath sounds: Normal breath sounds.   Chest:      Chest wall: No tenderness.   Abdominal:      General: Abdomen is flat.      Palpations: Abdomen is soft.      Tenderness: There is no abdominal tenderness. There is no right CVA tenderness or left CVA tenderness.   Musculoskeletal:         General: No swelling. Normal range of motion.      Cervical back: Neck supple. No tenderness.      Right lower leg: No edema.      Left lower leg: No edema.   Skin:     General: Skin is warm and dry.   Neurological:      General: No focal deficit present.      Mental Status: He is alert.      Sensory: No sensory deficit.      Motor: No weakness.      Coordination: Coordination normal.    Psychiatric:         Mood and Affect: Mood normal.         Vital Signs  ED Triage Vitals   Temperature Pulse Respirations Blood Pressure SpO2   08/14/24 2031 08/14/24 2031 08/14/24 2031 08/14/24 2031 08/14/24 2031   98.5 °F (36.9 °C) 85 18 156/74 98 %      Temp Source Heart Rate Source Patient Position - Orthostatic VS BP Location FiO2 (%)   08/14/24 2031 08/14/24 2031 08/14/24 2031 08/14/24 2031 --   Temporal Monitor Sitting Left arm       Pain Score       08/14/24 2034       5           Vitals:    08/14/24 2031   BP: 156/74   Pulse: 85   Patient Position - Orthostatic VS: Sitting         Visual Acuity  Visual Acuity      Flowsheet Row Most Recent Value   L Pupil Size (mm) 3   R Pupil Size (mm) 3            ED Medications  Medications   sodium chloride 0.9 % bolus 1,000 mL (has no administration in time range)   acetaminophen (Ofirmev) injection 1,000 mg (has no administration in time range)   morphine injection 4 mg (has no administration in time range)       Diagnostic Studies  Results Reviewed       None                   No orders to display              Procedures  ECG 12 Lead Documentation Only    Date/Time: 8/14/2024 9:40 PM    Performed by: Naina Chambers DO  Authorized by: Naina Chambers DO    Patient location:  ED  Previous ECG:     Previous ECG:  Compared to current    Comparison ECG info:  PVCs no longer present otherwise unchanged    Comparison to cardiac monitor: No    Interpretation:     Interpretation: non-specific    Rate:     ECG rate assessment: normal    Rhythm:     Rhythm: sinus rhythm    Ectopy:     Ectopy: none    QRS:     QRS axis:  Right    QRS intervals:  Normal  Conduction:     Conduction: normal    ST segments:     ST segments:  Normal  T waves:     T waves: non-specific             ED Course  ED Course as of 08/14/24 2232   Wed Aug 14, 2024   2151 Traumatic workup unremarkable, will admit for syncope with no prodrome.                 Identification of Seniors at Risk       Flowsheet Row Most Recent Value   (ISAR) Identification of Seniors at Risk    Before the illness or injury that brought you to the Emergency, did you need someone to help you on a regular basis? 0 Filed at: 08/14/2024 2036   In the last 24 hours, have you needed more help than usual? 1 Filed at: 08/14/2024 2036   Have you been hospitalized for one or more nights during the past 6 months? 0 Filed at: 08/14/2024 2036   In general, do you see well? 1 Filed at: 08/14/2024 2036   In general, do you have serious problems with your memory? 0 Filed at: 08/14/2024 2036   Do you take more than three different medications every day? 1 Filed at: 08/14/2024 2036   ISAR Score 3 Filed at: 08/14/2024 2036                        SBIRT 22yo+      Flowsheet Row Most Recent Value   Initial Alcohol Screen: US AUDIT-C     1. How often do you have a drink containing alcohol? 3 Filed at: 08/14/2024 2036   2. How many drinks containing alcohol do you have on a typical day you are drinking?  0 Filed at: 08/14/2024 2036   3b. FEMALE Any Age, or MALE 65+: How often do you have 4 or more drinks on one occassion? 0 Filed at: 08/14/2024 2036   Audit-C Score 3 Filed at: 08/14/2024 2036   DIDIER: How many times in the past year have you...    Used an illegal drug or used a prescription medication for non-medical reasons? Never Filed at: 08/14/2024 2036                      Medical Decision Making  Patient is a 79-year-old male past medical history of diabetes, hyperlipidemia, Graves', CAD, hypothyroid presenting for syncope and head injury.  Patient is well-appearing at bedside with stable vitals and in no acute distress.  He is mentating appropriately and has no gross amount is on neurologic exam but does have abrasion to the posterior head and is complaining of chest pain.  Will obtain cardiac workup to rule out ACS, electrolyte MIs, anemia, imaging to rule out traumatic injuries, have called trauma eval as patient has head injury with blood  thinners and will give pain control continue to monitor.    Amount and/or Complexity of Data Reviewed  Labs: ordered.  Radiology: ordered and independent interpretation performed.    Risk  Prescription drug management.                 Disposition  Final diagnoses:   None     ED Disposition       None          Follow-up Information    None         Patient's Medications   Discharge Prescriptions    No medications on file       No discharge procedures on file.    PDMP Review       None            ED Provider  Electronically Signed by             Naina Chambers DO  08/14/24 0483

## 2024-08-15 NOTE — PLAN OF CARE
Problem: INFECTION - ADULT  Goal: Absence or prevention of progression during hospitalization  Description: INTERVENTIONS:  - Assess and monitor for signs and symptoms of infection  - Monitor lab/diagnostic results  - Monitor all insertion sites, i.e. indwelling lines, tubes, and drains  - Monitor endotracheal if appropriate and nasal secretions for changes in amount and color  - [place] appropriate cooling/warming therapies per order  - Administer medications as ordered  - Instruct and encourage patient and family to use good hand hygiene technique  - Identify and instruct in appropriate isolation precautions for identified infection/condition  Outcome: Progressing

## 2024-08-15 NOTE — DISCHARGE INSTR - AVS FIRST PAGE
Trial Symbicort for subacute/chronic cough.  For now, use this medicine twice daily.  Please follow-up with your primary care to see how effective it is and whether you may need to also trial an acid blocking medicine such as PPI to help address your cough.

## 2024-08-15 NOTE — PLAN OF CARE
Problem: OCCUPATIONAL THERAPY ADULT  Goal: Performs self-care activities at highest level of function for planned discharge setting.  See evaluation for individualized goals.  Description: Treatment Interventions: ADL retraining, Patient/family training, Equipment evaluation/education, Energy conservation, Compensatory technique education          See flowsheet documentation for full assessment, interventions and recommendations.   Note: Limitation: Decreased ADL status, Decreased endurance, Decreased high-level ADLs  Prognosis: Good  Assessment: Patient is a 79 y.o. male seen for OT evaluation s/p admit to [facility] on 8/14/2024 w/Cough syncope. Commorbidities affecting patient's functional performance at time of assessment include: BPH, major depressive disorder, Graves disease, Type 2 DM, Anxiety, Cough Syncope, obesity,  CAD. Orders placed for OT evaluation and treatment.  Performed at least two patient identifiers during session including name and wristband.  Prior to admission,  Patient reported independnet with basic self care, needs assistance with LB ADLs. Patient lives with spouse and family in a one story house, [facility]. Patient ambulates without AD, recently started using a SPC, has had 2 falls in the last 6 months.  Personal factors affecting patient at time of initial evaluation include: difficulty performing ADLs and difficulty performing IADLs. Upon evaluation, patient requires modified independent assist for UB ADLs, minimal  assist for LB ADLs, transfers and functional ambulation in room and bathroom with contact guard assist, without AD.   Occupational performance is affected by the following deficits: dynamic sit/ stand balance deficit with poor standing tolerance time for self care and functional mobility, decreased activity tolerance, and increased pain.  Patient to benefit from continued Occupational Therapy treatment while in the hospital to address deficits as defined above and  maximize level of functional independence with ADLs and functional mobility. Occupational Performance areas to address include: bathing/ shower, dressing, toilet hygiene, and IADLs: safety procedures. From OT standpoint, recommendation at time of d/c would be Level 3.     Rehab Resource Intensity Level, OT: III (Minimum Resource Intensity)

## 2024-08-15 NOTE — PHYSICAL THERAPY NOTE
"Physical Therapy Evaluation     Patient's Name: Wero Mccormack    Admitting Diagnosis  Cough [R05.9]  Syncope [R55]  Head injury [S09.90XA]  Closed head injury, initial encounter [S09.90XA]    Problem List  Patient Active Problem List   Diagnosis    Hyperlipidemia, mixed    Hypothyroidism due to Hashimoto's thyroiditis    Anxiety    BMI 32.0-32.9,adult    Type 2 diabetes mellitus, without long-term current use of insulin (HCC)    Numbness and tingling of both legs    Graves disease    Transaminitis    Cough syncope    Major depressive disorder in full remission (HCC)    BPH (benign prostatic hyperplasia)       Past Medical History  Past Medical History:   Diagnosis Date    Anxiety     Coronary artery disease     Depression     Diabetes mellitus (HCC)     Graves disease     Hyperlipidemia     Hypothyroidism     Myopathy     Neuropathy        Past Surgical History  Past Surgical History:   Procedure Laterality Date    CORONARY ANGIOPLASTY WITH STENT PLACEMENT          08/15/24 1104   PT Last Visit   PT Visit Date 08/15/24   Note Type   Note type Evaluation   Pain Assessment   Pain Assessment Tool 0-10   Pain Score 2  (2/10 at rest, increases when coughing)   Pain Location/Orientation Location: Chest;Location: Rib Cage  (\"from coughing\")   Restrictions/Precautions   Weight Bearing Precautions Per Order No   Braces or Orthoses   (none per pt)   Other Precautions Fall Risk;Pain   Home Living   Type of Home House   Home Layout One level;Performs ADLs on one level;Stairs to enter without rails  (1 OCHOA)   Bathroom Shower/Tub Walk-in shower   Bathroom Toilet Standard   Bathroom Equipment Other (Comment)  (no DME at baseline)   Bathroom Accessibility Accessible   Home Equipment Cane  (none used at baseline)   Additional Comments recently using SPC   Prior Function   Level of [place] Independent with ADLs;Independent with functional mobility;Needs assistance with IADLS   Lives With Spouse;Daughter  (and 2 grandsons) "   Receives Help From Family   IADLs Independent with driving;Independent with meal prep;Independent with medication management   Falls in the last 6 months 1 to 4  (2 falls: 1 trip and 1 syncopal episode)   Vocational Retired  (teacher)   Comments pt reports he is supposed to be starting OPPT in 2 weeks, to help with core strengthening.  pt reports he is involved with 4H   General   Family/Caregiver Present No   Cognition   Overall Cognitive Status WFL   Arousal/Participation Alert   Attention Within functional limits   Orientation Level Oriented X4   Memory Within functional limits   Following Commands Follows all commands and directions without difficulty   RLE Assessment   RLE Assessment   (grossly 4/5)   LLE Assessment   LLE Assessment   (grossly 4/5)   Vision-Basic Assessment   Current Vision Wears glasses all the time   Visual History Cataracts   Coordination   Movements are Fluid and Coordinated 0  (tremors)   Sensation X  (neuropathy b/l knees and distal)   Light Touch   RLE Light Touch Impaired   Sharp/Dull   RLE Sharp/Dull Impaired   Bed Mobility   Supine to Sit 5  Supervision   Additional items Assist x 1;HOB elevated;Bedrails;Verbal cues   Transfers   Sit to Stand 4  Minimal assistance  (CGA)   Additional items Assist x 1;Increased time required;Verbal cues;Bedrails   Stand to Sit 4  Minimal assistance  (CGA)   Additional items Assist x 1;Armrests;Increased time required;Verbal cues   Ambulation/Elevation   Gait pattern Wide ZACH;Decreased foot clearance;Short stride   Gait Assistance 4  Minimal assist  (CGA)   Additional items Assist x 1;Verbal cues   Assistive Device None   Distance 50'   Balance   Static Sitting Good   Dynamic Sitting Fair +   Static Standing Fair +   Dynamic Standing Fair +   Ambulatory Fair   Endurance Deficit   Endurance Deficit Yes   Activity Tolerance   Activity Tolerance Patient limited by fatigue   Medical Staff Made Aware Pt seen as a co-eval with OT due to the patient's  co-morbidities, clinically unstable presentation, and present impairments which are a regression from the patient's baseline.   Nurse Made Aware RN Crystal   Assessment   Prognosis Good   Problem List Decreased strength;Decreased endurance;Decreased mobility;Impaired balance;Impaired sensation   Assessment Pt is 79 y.o. male seen for PT evaluation on 8/15/2024 s/p admit to [facility] on 8/14/2024 w/ Cough syncope. PT was consulted to assess pt's functional mobility and d/c needs. Order placed for PT eval and tx. PTA, pt resides in [facility] with 1 OCHOA, ambulates without AD, + fall history. At time of eval, pt performing bed mobility SBA, transfers and household distance gait trial with CGA. Upon evaluation, pt presenting with impaired functional mobility d/t decreased strength, decreased endurance, impaired balance, decreased mobility, impaired sensation, and activity intolerance. Pertinent PMHx and current co-morbidities affecting pt's physical performance at time of assessment include: cough syncope, HLD, anxiety, type 2 DM, graves disease, major depressive disorder, BPH, transaminitis. Personal factors affecting pt at time of eval include: stairs to enter home and positive fall history. The following objective measures performed on IE also reveal limitations: Barthel Index: 55/100, Modified [place]: 2 (slight disability), and AM-PAC 6-Clicks: 20/24. Pt's clinical presentation is currently evolving seen in pt's presentation of abnormal lab value(s), need for input for task focus and mobility technique, and ongoing medical assessment. Overall, pt's rehab potential and prognosis to return to OF is good as impacted by objective findings, warranting pt to receive further skilled PT interventions to address identified impairments, activity limitation(s), and participation restriction(s). Pt to benefit from continued PT tx to address deficits as defined above and maximize level of functional independent mobility. From  PT/mobility standpoint, recommend level 3, minimal resource intensity in order to facilitate return to PLOF.   Barriers to Discharge Inaccessible home environment   Goals   Patient Goals to go home   STG Expiration Date 08/25/24   Short Term Goal #1 In 7-10 days: Increase bilateral LE strength 1/2 grade to facilitate independent mobility, Perform all bed mobility tasks modified independent to decrease caregiver burden, Perform all transfers modified independent to improve independence, Ambulate > 150 ft. with least restrictive assistive device modified independent w/o LOB and w/ normalized gait pattern 100% of the time, Navigate 1 stairs modified independent with unilateral handrail to facilitate return to previous living environment, and Increase all balance 1/2 grade to decrease risk for falls   PT Treatment Day 0   Plan   Treatment/Interventions Functional transfer training;LE strengthening/ROM;Therapeutic exercise;Endurance training;Elevations;Patient/family training;Bed mobility;Gait training;Spoke to nursing   PT Frequency 1-2x/wk   Discharge Recommendation   Rehab Resource Intensity Level, PT III (Minimum Resource Intensity)   AM-PAC Basic Mobility Inpatient   Turning in Flat Bed Without Bedrails 4   Lying on Back to Sitting on Edge of Flat Bed Without Bedrails 4   Moving Bed to Chair 3   Standing Up From Chair Using Arms 3   Walk in Room 3   Climb 3-5 Stairs With Railing 3   Basic Mobility Inpatient Raw Score 20   Basic Mobility Standardized Score 43.99   [facility] Highest Level Of Mobility   -HLM Goal 6: Walk 10 steps or more   -HLM Achieved 7: Walk 25 feet or more   Modified Lisa Scale   Modified [place] Scale 2   Barthel Index   Feeding 10   Bathing 0   Grooming Score 5   Dressing Score 5   Bladder Score 10   Bowels Score 10   Toilet Use Score 5   Transfers (Bed/Chair) Score 10   Mobility (Level Surface) Score 0   Stairs Score 0   Barthel Index Score 55           Ayesha Meredith, PT

## 2024-08-15 NOTE — QUICK NOTE
SLIM quick note:     See H&P from this morning for full details.  In brief summary, 79-year-old male with 4 weeks of mostly dry, rarely minimally productive cough at home presented with syncopal event in the setting of coughing fit.  He currently feels back to baseline.  States cough is actually been a little bit better since he has been here.  Denies any heartburn/reflux symptoms.    Exam:  Respiratory: Scattered end expiratory wheezing    Plan:  1.  Cough syncope:  -No cardiac concerns.  Serial troponin negative.  Back to baseline.  -Orthostatics negative in ED  -Suspect that his subacute/chronic cough is secondary to asthma in the setting of multiple environmental triggers at home (patient has multiple pets including dogs, ducks, turkeys, chickens)  -Trial albuterol here.  If this provides improvement in cough, can discharge home with asthma treatments later today.  Symptoms not currently bad enough to warrant steroids.  -Viral swab negative  -PT recs pending. OT level III

## 2024-08-15 NOTE — OCCUPATIONAL THERAPY NOTE
Occupational Therapy Treatment Note        Patient Name: Wero Mccormack  Today's Date: 8/15/2024     08/15/24 1130   OT Last Visit   OT Visit Date 08/15/24   Note Type   Note type Evaluation   Pain Assessment   Pain Assessment Tool 0-10   Pain Score 2  (2/10 at rest, increases when coughing)   Pain Location/Orientation Location: Chest;Location: Rib Cage  (from coughing)   Restrictions/Precautions   Weight Bearing Precautions Per Order No   Braces or Orthoses   (none per pt)   Other Precautions Fall Risk;Pain   Home Living   Type of Home House   Home Layout One level;Performs ADLs on one level;Stairs to enter without rails   Bathroom Shower/Tub Walk-in shower   Bathroom Toilet Standard   Bathroom Equipment Other (Comment)  (no DME at baseline)   Bathroom Accessibility Accessible   Home Equipment Cane   Additional Comments ambulatory without AD, recently started using a SPC   Prior Function   Level of [place] Independent with ADLs;Independent with functional mobility;Needs assistance with IADLS   Lives With Spouse;Daughter  (and 2 grandsons)   Receives Help From Family   IADLs Independent with driving;Independent with meal prep;Independent with medication management   Falls in the last 6 months 1 to 4  (2 falls: 1 trip and 1 syncopal episode)   Vocational Retired  (teacher)   Comments pt reports he is supposed to be starting OPPT in 2 weeks, to help with core strengthening. pt reports he is involved with 4H   Lifestyle   Autonomy Patient reported independnet with basic self care, needs assistance with LB ADLs. Patient lives with spouse and family in a one story house, 2 OCHOA. Patient ambulates without AD, recently started using a SPC, has had 2 falls in the last 6 months.   Reciprocal Relationships Supportive Family   Service to Others Retired Teacher   Intrinsic Gratification involved in the 4- H community program   General   Family/Caregiver Present No   ADL   Where Assessed Edge of bed   Eating  Assistance 7  Independent   Grooming Assistance 6  Modified Independent   UB Bathing Assistance 6  Modified Independent   LB Bathing Assistance 4  Minimal Assistance   UB Dressing Assistance 6  Modified independent   LB Dressing Assistance 4  Minimal Assistance   Toileting Assistance  4  Minimal Assistance   Functional Assistance 5  Supervision/Setup   Bed Mobility   Supine to Sit 5  Supervision   Additional items Assist x 1;HOB elevated;Bedrails;Verbal cues   Transfers   Sit to Stand   (contact guard)   Additional items Verbal cues  (side rail use for support)   Stand to Sit   (contact guard)   Additional items Armrests   Functional Mobility   Functional Mobility 6  Modified independent   Additional Comments ambulated in room and short distance in hallway  without AD   Balance   Static Sitting Good   Dynamic Sitting Fair +   Static Standing Fair +   Dynamic Standing Fair +   Activity Tolerance   Activity Tolerance Patient limited by fatigue   Medical Staff Made Aware Pt seen as a co-eval with PT due to the patient's co-morbidities, clinically unstable presentation, and present impairments which are a regression from the patient's baseline.   RUE Assessment   RUE Assessment WFL   LUE Assessment   LUE Assessment WFL   Hand Function   Gross Motor Coordination Functional   Fine Motor Coordination Functional   Sensation   Light Touch Partial deficits in the RLE;Partial deficits in the LLE   Vision-Basic Assessment   Current Vision Wears glasses all the time   Visual History Cataracts   Psychosocial   Psychosocial (WDL) WDL   Perception   Inattention/Neglect Appears intact   Cognition   Overall Cognitive Status WFL   Arousal/Participation Alert;Responsive;Cooperative   Attention Within functional limits   Orientation Level Oriented X4   Memory Within functional limits   Following Commands Follows all commands and directions without difficulty   Assessment   Limitation Decreased ADL status;Decreased endurance;Decreased  high-level ADLs   Prognosis Good   Assessment Patient is a 79 y.o. male seen for OT evaluation s/p admit to [facility] on 8/14/2024 w/Cough syncope. Commorbidities affecting patient's functional performance at time of assessment include: BPH, major depressive disorder, Graves disease, Type 2 DM, Anxiety, Cough Syncope, obesity,  CAD. Orders placed for OT evaluation and treatment.  Performed at least two patient identifiers during session including name and wristband.  Prior to admission,  Patient reported independnet with basic self care, needs assistance with LB ADLs. Patient lives with spouse and family in a one story house, 2 OCHOA. Patient ambulates without AD, recently started using a SPC, has had 2 falls in the last 6 months.  Personal factors affecting patient at time of initial evaluation include: difficulty performing ADLs and difficulty performing IADLs. Upon evaluation, patient requires modified independent assist for UB ADLs, minimal  assist for LB ADLs, transfers and functional ambulation in room and bathroom with contact guard assist, without AD.   Occupational performance is affected by the following deficits: dynamic sit/ stand balance deficit with poor standing tolerance time for self care and functional mobility, decreased activity tolerance, and increased pain.  Patient to benefit from continued Occupational Therapy treatment while in the hospital to address deficits as defined above and maximize level of functional independence with ADLs and functional mobility. Occupational Performance areas to address include: bathing/ shower, dressing, toilet hygiene, and IADLs: safety procedures. From OT standpoint, recommendation at time of d/c would be Level 3.   Plan   Treatment Interventions ADL retraining;Patient/family training;Equipment evaluation/education;Energy conservation;Compensatory technique education   Goal Expiration Date 08/22/24   OT Frequency 1-2x/wk   Discharge Recommendation    Rehab Resource Intensity Level, OT III (Minimum Resource Intensity)   AM-PAC Daily Activity Inpatient   Lower Body Dressing 3   Bathing 3   Toileting 3   Upper Body Dressing 4   Grooming 4   Eating 4   Daily Activity Raw Score 21   Daily Activity Standardized Score (Calc for Raw Score >=11) 44.27   AM-PAC Applied Cognition Inpatient   Following a Speech/Presentation 4   Understanding Ordinary Conversation 4   Taking Medications 4   Remembering Where Things Are Placed or Put Away 4   Remembering List of 4-5 Errands 4   Taking Care of Complicated Tasks 4   Applied Cognition Raw Score 24   Applied Cognition Standardized Score 62.21   Barthel Index   Feeding 10   Bathing 0   Grooming Score 5   Dressing Score 5   Bladder Score 10   Bowels Score 10   Toilet Use Score 5   Transfers (Bed/Chair) Score 10   Mobility (Level Surface) Score 0   Stairs Score 0   Barthel Index Score 55       Occupational therapy Goals:    1- Patient will verbalize and demonstrate use of energy conservation/ deep breathing technique and work simplification skills during functional activity with no verbal cues.  2- Patient will verbalize and demonstrate good body mechanics and joint protection techniques during ADLs/ IADLs with no verbal cues   3- Patient will increase OOB/ sitting tolerance to 4-6 hours per day for increased participation in self care and leisure tasks with no s/s of exertion.  4- Patient will identify s/s of exertion during ADL and functional mobility with no verbal cues  5-Patient will verbalize/ demonstrate compensatory strategies to recover from exertion with no verbal cues.   6-Patient will increase standing tolerance time to 10 minutes with No UE support to complete sink level ADLs @ Mod I level   7- Patient will increase sitting tolerance at edge of bed to 30 minutes to complete LB ADLs @ Indep. Level/ with use of long handle adaptive equipment as indicated.  8-- Patient/ Family will demonstrate competency with UE Home  Exercise Program.

## 2024-08-15 NOTE — PLAN OF CARE
Problem: PHYSICAL THERAPY ADULT  Goal: Performs mobility at highest level of function for planned discharge setting.  See evaluation for individualized goals.  Description: Treatment/Interventions: Functional transfer training, LE strengthening/ROM, Therapeutic exercise, Endurance training, Elevations, Patient/family training, Bed mobility, Gait training, Spoke to nursing          See flowsheet documentation for full assessment, interventions and recommendations.  8/15/2024 1421 by Ayesha Meredith PT  Note: Prognosis: Good  Problem List: Decreased strength, Decreased endurance, Decreased mobility, Impaired balance, Impaired sensation  Assessment: Pt is 79 y.o. male seen for PT evaluation on 8/15/2024 s/p admit to [facility] on 8/14/2024 w/ Cough syncope. PT was consulted to assess pt's functional mobility and d/c needs. Order placed for PT eval and tx. PTA, pt resides in [facility] with 1 OCHOA, ambulates without AD, + fall history. At time of eval, pt performing bed mobility SBA, transfers and household distance gait trial with CGA. Upon evaluation, pt presenting with impaired functional mobility d/t decreased strength, decreased endurance, impaired balance, decreased mobility, impaired sensation, and activity intolerance. Pertinent PMHx and current co-morbidities affecting pt's physical performance at time of assessment include: cough syncope, HLD, anxiety, type 2 DM, graves disease, major depressive disorder, BPH, transaminitis. Personal factors affecting pt at time of eval include: stairs to enter home and positive fall history. The following objective measures performed on IE also reveal limitations: Barthel Index: 55/100, Modified [place]: 2 (slight disability), and AM-PAC 6-Clicks: 20/24. Pt's clinical presentation is currently evolving seen in pt's presentation of abnormal lab value(s), need for input for task focus and mobility technique, and ongoing medical assessment. Overall, pt's rehab potential and  prognosis to return to PLOF is good as impacted by objective findings, warranting pt to receive further skilled PT interventions to address identified impairments, activity limitation(s), and participation restriction(s). Pt to benefit from continued PT tx to address deficits as defined above and maximize level of functional independent mobility. From PT/mobility standpoint, recommend level 3, minimal resource intensity in order to facilitate return to PLOF.  Barriers to Discharge: Inaccessible home environment     Rehab Resource Intensity Level, PT: III (Minimum Resource Intensity)    See flowsheet documentation for full assessment.     8/15/2024 1421 by Ayesha Meredith PT  Note: Prognosis: Good  Problem List: Decreased strength, Decreased endurance, Decreased mobility, Impaired balance, Impaired sensation  Assessment: Pt is 79 y.o. male seen for PT evaluation on 8/15/2024 s/p admit to [facility] on 8/14/2024 w/ Cough syncope. PT was consulted to assess pt's functional mobility and d/c needs. Order placed for PT eval and tx. PTA, pt resides in [facility] with 1 [facility], ambulates without AD, + fall history. At time of eval, pt performing bed mobility SBA, transfers and household distance gait trial with CGA. Upon evaluation, pt presenting with impaired functional mobility d/t decreased strength, decreased endurance, impaired balance, decreased mobility, impaired sensation, and activity intolerance. Pertinent PMHx and current co-morbidities affecting pt's physical performance at time of assessment include: cough syncope, HLD, anxiety, type 2 DM, graves disease, major depressive disorder, BPH, transaminitis. Personal factors affecting pt at time of eval include: stairs to enter home and positive fall history. The following objective measures performed on IE also reveal limitations: Barthel Index: 55/100, Modified [place]: 2 (slight disability), and AM-PAC 6-Clicks: 20/24. Pt's clinical presentation is currently evolving  seen in pt's presentation of abnormal lab value(s), need for input for task focus and mobility technique, and ongoing medical assessment. Overall, pt's rehab potential and prognosis to return to PLOF is good as impacted by objective findings, warranting pt to receive further skilled PT interventions to address identified impairments, activity limitation(s), and participation restriction(s). Pt to benefit from continued PT tx to address deficits as defined above and maximize level of functional independent mobility. From PT/mobility standpoint, recommend level 3, minimal resource intensity in order to facilitate return to PLOF.  Barriers to Discharge: Inaccessible home environment     Rehab Resource Intensity Level, PT: III (Minimum Resource Intensity)    See flowsheet documentation for full assessment.

## 2024-08-15 NOTE — H&P
"[facility]  H&P  Name: Wero Mccormack 79 y.o. male I MRN: [id number]  Unit/Bed#: -01 I Date of Admission: 8/14/2024   Date of Service: 8/15/2024  Hospital Day: 0      Assessment & Plan   BPH (benign prostatic hyperplasia)  Assessment & Plan  Asymptomatic on home-scheduled Flomax 0.4 mg p.o. every morning with no need for Stanton catheter as patient maintains brisk urine output on observation date 08/14/2024.  Hence, patient will continue with this medication while patient remains in [facility]    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  Continue patient's home dose scheduled duloxetine 60 mg p.o. daily while patient remains in [facility]    Graves disease  Assessment & Plan  Patient has no goiter, lid lag, or proptosis on exam.  Patient appears to be euthyroid on home-scheduled Synthroid 150 mcg p.o. daily with last screening TSH 0.671 uIU/mL (01/09/2024, 11:27 AM).    Type 2 diabetes mellitus, without long-term current use of insulin ([facility])  Assessment & Plan  Lab Results   Component Value Date    HGBA1C 7.0 (H) 01/09/2024       No results for input(s): \"POCGLU\" in the last 72 hours.    Blood Sugar Average: Last 72 hrs:    Cf., serum glucose 166 mg/dL (08/14/2024, 9:13 PM).    Continue carbohydrate consistent diet, POC glucose before every meal and nightly, and lispro insulin sliding scale before every meal and nightly.  Hold off patient's home-scheduled glipizide 25 mg-metformin 500 mg tablet, 2 tablets p.o. daily, while patient remains in hospital, given the potential for this combination medication to cause hypoglycemia/dyspepsia.    Anxiety  Assessment & Plan  Asymptomatic on alprazolam 0.25 mg p.o. daily as needed for anxiety at home.  Patient will continue with this medication while in [facility] [place]    Hyperlipidemia, mixed  Assessment & Plan  Asymptomatic on rosuvastatin 5 mg p.o. nightly at home.  Patient will receive pravastatin 40 mg p.o. nightly in [facility]    * Cough syncope  Assessment & Plan  Patient reports that he has suffered from a persistent cough for the past 4 weeks at home.  Patient reports that the cough is intermittently productive of clear/white phlegm and no hemoptysis; at other times, the cough is non-productive.  Patient reports that he is taken azithromycin (Z-Luis) x 2 with no clinical relief in his persistent cough for the past 4 weeks at home; in fact, patient complains that his chest hurts whenever he coughs bilaterally, and is non-pleuritic, non-traumatic, non-radiating, non-exertional, and non-positional.    Patient reports that he stood up from his chair and then started coughing earlier today (08/14/2024, 4 PM), and subsequently lost consciousness.  Patient does not recall, however, losing consciousness after standing up and starting to cough non-productively.    In the ER, patient had no complaints of cough at all.  In addition, patient was not orthostatic.      Labs in the ER were remarkable only for BUN/crt 26/1.24 (08/14/2024, 9:13 PM) and WBC 11.69, N70 L18 M10 E2 (08/14/2024, 9:13pm).      Patient was subsequently placed in OBSERVATION on the hospitalist service at [facility] on 08/14/2024 with the following diagnoses:    Cough syncope.   Acute dehydration with BUN:creatinine ratio > 20:1 (e.g., BUN 26, creatinine 1.24).   Acute leukocytosis with WBC 11.69.    To address #1, supportive care is recommended.  To address #2, patient received 1 liter of 0.9% NS @ 1000 mL/hr (08/14/2024, 9:00 PM).  I will check BUN:creatinine ratio in the 08/15/2024, 6:00 AM).  To address #3, patient awaits COVID testing and repeat WBC w/diff in the 08/15/2024, 6:00 AM.        VTE Pharmacologic Prophylaxis:   Moderate Risk (Score  "3-4) - Pharmacological DVT Prophylaxis Ordered: heparin.  Code Status: Level 1 - Full Code as per patient's wishes.  Discussion with family: Updated  (wife) at bedside.    Anticipated Length of Stay: Patient will be admitted on an observation basis with an anticipated length of stay of less than 2 midnights secondary to cough syncope..    Total Time Spent on Date of Encounter in care of patient: 71 mins. This time was spent on one or more of the following: performing physical exam; counseling and coordination of care; obtaining or reviewing history; documenting in the medical record; reviewing/ordering tests, medications or procedures; communicating with other healthcare professionals and discussing with patient's family/caregivers.    Chief Complaint: \"I have been having a cough for the past 4 weeks; today I stood up, and coughed, and the next thing I was told, was that I passed out.  I do not remember passing out myself.\"    History of Present Illness:  Wero Mccormack is a 79 y.o. male with a PMH of FULL CODE @ home, obesity with BMI 32.13 (height 180.3 cm; weight 104.0 kg), Grave's disease (since 1990), non-insulin dependent DM2 with HbA1c 7.0% (01/09/2024, 11:27am), BPH, anxiety disorder, and major depression, who presents with persistent cough (intermittently productive of clear/white phlegm) for the past 4 weeks, unresponsive to azithromycin (Z kyler) x 2, who reports standing up from a seated position at his home on 08/14/2024, 4:00pm, coughing non-productively, and then losing consciousness (as reported by patient's wife, but not recalled by patient himself).    In addition, patient complains that his chest hurts whenever he coughs bilaterally, and is non-pleuritic, non-traumatic, non-radiating, non-exertional, and non-positional.    Patient reports that he stood up from his chair and then started coughing earlier today (08/14/2024, 4 PM), and subsequently lost consciousness.  Patient does not " recall, however, losing consciousness after standing up and starting to cough non-productively.    In the ER, patient had no complaints of cough at all.  In addition, patient was not orthostatic.      Labs in the ER were remarkable only for BUN/crt 26/1.24 (08/14/2024, 9:13 PM) and WBC 11.69, N70 L18 M10 E2 (08/14/2024, 9:13pm).      Patient was subsequently placed in OBSERVATION on the hospitalist service at [facility] on 08/14/2024 with the following diagnoses:    Cough syncope.   Acute dehydration with BUN:creatinine ratio > 20:1 (e.g., BUN 26, creatinine 1.24).   Acute leukocytosis with WBC 11.69.    To address #1, supportive care is recommended.  To address #2, patient received 1 liter of 0.9% NS @ 1000 mL/hr (08/14/2024, 9:00 PM).  I will check BUN:creatinine ratio in the 08/15/2024, 6:00 AM).  To address #3, patient awaits COVID testing and repeat WBC w/diff in the 08/15/2024, 6:00 AM.  Review of Systems:  Review of Systems   Respiratory:  Positive for cough.    Neurological:  Positive for syncope.   All other systems reviewed and are negative.      Past Medical and Surgical History:   Past Medical History:   Diagnosis Date    Anxiety     Coronary artery disease     Depression     Diabetes mellitus (HCC)     Graves disease     Hyperlipidemia     Hypothyroidism     Myopathy     Neuropathy        Past Surgical History:   Procedure Laterality Date    CORONARY ANGIOPLASTY WITH STENT PLACEMENT         Meds/Allergies:  Prior to Admission medications    Medication Sig Start Date End Date Taking? Authorizing Provider   albuterol (PROVENTIL HFA,VENTOLIN HFA) 90 mcg/act inhaler Inhale 2 puffs every 4 (four) hours as needed for wheezing  Patient not taking: Reported on 4/16/2024 2/19/21   Carli Collins DO   ALPRAZolam (XANAX) 0.25 mg tablet Take 0.25 mg by mouth PRN 8/28/20   Historical Provider, MD   ascorbic acid (VITAMIN C) 1000 MG tablet Take 1 tablet (1,000 mg total) by mouth  every 12 (twelve) hours for 5 days 2/22/21 2/27/21  Aditya Turner MD   aspirin (ECOTRIN LOW STRENGTH) 81 mg EC tablet Take 81 mg by mouth daily    Historical Provider, MD   cholecalciferol (VITAMIN D3) 1,000 units tablet Take 2 tablets (2,000 Units total) by mouth daily for 10 days 2/23/21 3/5/21  Aditya Turner MD   DULoxetine (CYMBALTA) 60 mg delayed release capsule Take 60 mg by mouth daily    Historical Provider, MD   glyBURIDE-metFORMIN (GLUCOVANCE) 2.5-500 MG per tablet Take 2 tablets by mouth 11/4/20   Historical Provider, MD   Jardiance 10 MG TABS tablet Take 10 mg by mouth daily 3/2/24   Historical Provider, MD   levothyroxine (Synthroid) 150 mcg tablet Take 150 mcg by mouth 11/4/20   Historical Provider, MD   methylPREDNISolone 4 MG tablet therapy pack Use as directed on package  Patient not taking: Reported on 4/16/2024 2/19/21   Carli Collins DO   multivitamin-minerals (CENTRUM ADULTS) tablet Take 1 tablet by mouth daily 2/27/21 3/29/21  Aditya Turner MD   omega-3-acid ethyl esters (LOVAZA) 1 g capsule Take 2 capsules by mouth every 12 (twelve) hours 3/11/24   Historical Provider, MD   primidone (MYSOLINE) 250 mg tablet Take 1 tablet (250 mg total) by mouth every 8 (eight) hours 10/14/20 2/19/21  ELLY Polanco   propranolol (INDERAL) 10 mg tablet TAKE ONE (1) TABLET (10 MG TOTAL) BY MOUTH TWICE DAILY 11/22/21   Florencio Pitts MD   rosuvastatin (CRESTOR) 5 mg tablet TAKE 1 TABLET (5 MG) DAILY BY MOUTH 4/1/24   Historical Provider, MD   tamsulosin (FLOMAX) 0.4 mg TAKE 1 CAPSULE (0.4 MG) EVERY MORNING BY MOUTH    Historical Provider, MD     I have reviewed home medications with patient personally.    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   Retired.  Patient Pre-hospital Living Situation: Home, With spouse  Patient Pre-hospital Level of Mobility:  Walks without assistance from cane, walker, or wheelchair.  Patient Pre-hospital Diet Restrictions: Low-fat, low-cholesterol, low-sodium, carbohydrate consistent diet.  Substance Use History:   Social History     Substance and Sexual Activity   Alcohol Use Never     Social History     Tobacco Use   Smoking Status Former    Current packs/day: 0.25    Average packs/day: 0.3 packs/day for 5.0 years (1.3 ttl pk-yrs)    Types: Cigarettes   Smokeless Tobacco Never     Social History     Substance and Sexual Activity   Drug Use Never       Family History:  Patient's father is  at 53 years of age from congestive heart failure not otherwise specified.  Patient's mother is  at 38 years of age from breast cancer that metastasized.    Physical Exam:     Vitals:   Blood Pressure: 149/80 (24)  Pulse: 68 (24)  Temperature: 97.9 °F (36.6 °C) (24)  Temp Source: Temporal (24)  Respirations: 15 (24)  Weight - Scale: 104 kg (230 lb 6.1 oz) (24)  SpO2: 96 % (24)    Physical Exam  Vitals reviewed.   Constitutional:       Appearance: Normal appearance.   HENT:      Head: Normocephalic and atraumatic.      Nose: Nose normal.      Mouth/Throat:      Mouth: Mucous membranes are moist.      Pharynx: Oropharynx is clear.   Eyes:      Extraocular Movements: Extraocular movements intact.      Conjunctiva/sclera: Conjunctivae normal.      Pupils: Pupils are equal, round, and reactive to light.   Cardiovascular:      Rate and Rhythm: Normal rate and regular rhythm.      Pulses: Normal pulses.      Heart sounds: Normal heart sounds.   Pulmonary:      Effort: Pulmonary effort is normal.      Breath sounds: Normal breath sounds.   Abdominal:      General: Abdomen is flat. Bowel sounds are normal.      Palpations: Abdomen is soft.   Musculoskeletal:          General: Normal range of motion.      Cervical back: Normal range of motion and neck supple.   Skin:     General: Skin is warm and dry.      Capillary Refill: Capillary refill takes less than 2 seconds.   Neurological:      General: No focal deficit present.      Mental Status: He is alert and oriented to person, place, and time. Mental status is at baseline.   Psychiatric:         Mood and Affect: Mood normal.         Thought Content: Thought content normal.         Judgment: Judgment normal.     No psychosis.    Additional Data:     Lab Results:  Results from last 7 days   Lab Units 08/14/24 2316 08/14/24 2113   WBC Thousand/uL  --  11.69*   HEMOGLOBIN g/dL  --  16.5   HEMATOCRIT %  --  47.7   PLATELETS Thousands/uL 167 205   SEGS PCT %  --  70   LYMPHO PCT %  --  18   MONO PCT %  --  10   EOS PCT %  --  2     Results from last 7 days   Lab Units 08/14/24 2113   SODIUM mmol/L 137   POTASSIUM mmol/L 4.8   CHLORIDE mmol/L 100   CO2 mmol/L 29   BUN mg/dL 26*   CREATININE mg/dL 1.24   ANION GAP mmol/L 8   CALCIUM mg/dL 9.3   ALBUMIN g/dL 4.5   TOTAL BILIRUBIN mg/dL 0.45   ALK PHOS U/L 88   ALT U/L 32   AST U/L 26   GLUCOSE RANDOM mg/dL 166*             Lab Results   Component Value Date    HGBA1C 7.0 (H) 01/09/2024    HGBA1C 6.0 (H) 02/20/2021     Results from last 7 days   Lab Units 08/14/24 2316 08/14/24 2113   LACTIC ACID mmol/L 1.6  --    PROCALCITONIN ng/ml  --  <0.05       Lines/Drains:  Invasive Devices       Peripheral Intravenous Line  Duration             Peripheral IV 08/14/24 Distal;Right;Upper;Ventral (anterior) Arm <1 day                        Imaging: Reviewed radiology reports from this admission including: chest xray  XR Trauma chest portable   ED Interpretation by Naina Chambers DO (08/14 2114)   NAD      Final Result by Sagrario Rivas MD (08/14 2138)      No acute cardiopulmonary disease.            Workstation performed: [id number]         TRAUMA - CT spine cervical wo contrast    Final Result by Sagrario Rivas MD (08/14 2142)      No cervical spine fracture or traumatic malalignment.      The study was marked in EPIC for immediate notification.            Workstation performed: [id number]         TRAUMA - CT head wo contrast   Final Result by Sagrario Rivas MD (08/14 2141)      No acute intracranial abnormality.      The study was marked in EPIC for immediate notification.                  Workstation performed: [id number]         TRAUMA - CT chest abdomen pelvis w contrast   Final Result by Sagrario Rivas MD (08/14 2142)      No findings of acute traumatic injury in the chest, abdomen or pelvis.      The study was marked in EPIC for immediate notification.         Workstation performed: [id number]             EKG and Other Studies Reviewed on Admission:   EKG: NSR. HR 84, , , no acute ST elevations/depressions (by my review)..    ** Please Note: This note has been constructed using a voice recognition system. **

## 2024-08-15 NOTE — DISCHARGE SUMMARY
[facility]  Discharge- Wero Mccormack 1945, 79 y.o. male MRN: [id number]  Unit/Bed#: -Frankie Encounter: [id number]  Primary Care Provider: No primary care provider on file.   Date and time admitted to hospital: 8/14/2024  8:41 PM    Subacute cough  Assessment & Plan  Subacute cough was not preceded by clear-cut viral illness, although this remains a possibility for etiology.  He does have a history of asthma, but apparently albuterol nebulizer at home was not helping per his wife.  Discussed options with him and reviewed that common causes of subacute/chronic cough can be asthma and GERD.  They would like to trial Symbicort first rather than PPI.  They will follow-up outpatient with PCP to see with the responses to this  He does have several animals at home including dogs, turkeys, ducks, birds and his symptoms did seem to improve somewhat from being in the hospital, so we did discuss that environmental triggers may be playing a role in this, but not definitive.    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 (HCC)  Assessment & Plan  No acute symptoms    Graves disease  Assessment & Plan  Continue synthroid    Type 2 diabetes mellitus, without long-term current use of insulin (HCC)  Assessment & Plan  Lab Results   Component Value Date    HGBA1C 7.3 (H) 08/14/2024       Recent Labs     08/15/24  0716 08/15/24  0920 08/15/24  1132 08/15/24  1605   POCGLU 115 231* 142* 121       Blood Sugar Average: Last 72 hrs:  (P) 152.25  Continue home meds    Anxiety  Assessment & Plan  Continue home alprazolam    Hyperlipidemia, mixed  Assessment & Plan  Continue rosuvastatin     * Cough syncope  Assessment & Plan  Presented with an episode of syncope in the setting of coughing fit   Patient has had 4 weeks of subacute/chronic cough at this point-see plan below      Medical Problems       Resolved Problems  Date Reviewed:  8/15/2024   None       Discharging Physician / Practitioner: Ranjan Ames MD  PCP: No primary care provider on file.  Admission Date:   Admission Orders (From admission, onward)       Ordered        08/14/24 2201  Place in Observation  Once                          Discharge Date: 08/15/24    Consultations During Hospital Stay:  None    Procedures Performed:   None    Significant Findings / Test Results:   XR Trauma chest portable   ED Interpretation by Naina Chambers DO (08/14 2114)   NAD      Final Result by Sagrario Rivas MD (08/14 2138)      No acute cardiopulmonary disease.            Workstation performed: [id number]         TRAUMA - CT spine cervical wo contrast   Final Result by Sagrario Rivas MD (08/14 2142)      No cervical spine fracture or traumatic malalignment.      The study was marked in EPIC for immediate notification.            Workstation performed: [id number]         TRAUMA - CT head wo contrast   Final Result by Sagrario Rivas MD (08/14 2141)      No acute intracranial abnormality.      The study was marked in EPIC for immediate notification.                  Workstation performed: [id number]         TRAUMA - CT chest abdomen pelvis w contrast   Final Result by Sagrario Rivas MD (08/14 2142)      No findings of acute traumatic injury in the chest, abdomen or pelvis.      The study was marked in EPIC for immediate notification.         Workstation performed: [id number]           Incidental Findings:   None     Test Results Pending at Discharge (will require follow up):   None     Outpatient Tests Requested:  None    Complications:  None    Reason for Admission: Syncope    Hospital Course:   Wero Mccormack is a 79 y.o. male patient who originally presented to the hospital on 8/14/2024 due to syncopal event at home in the setting of coughing fit.  This appears to just have been vasovagal syncope as his EKG here looked good, serial troponins were negative, and he had no  recurrence of presyncopal symptoms.  Trauma scans were negative on admission.  He did endorse that his subacute cough was slightly better since being admitted.  He ultimately was discharged home on Symbicort for trial to see whether or not his asthma may be driving the subacute cough as he was noted to have scattered wheezing here.    Please see above list of diagnoses and related plan for additional information.     Condition at Discharge: stable    Discharge Day Visit / Exam:   Subjective: Patient reports he feels better now.  Still having cough, but slightly less often than at home.  No presyncopal symptoms or recurrence of syncope.  Vitals: Blood Pressure: 140/76 (08/15/24 1438)  Pulse: 80 (08/15/24 1438)  Temperature: 98.5 °F (36.9 °C) (08/15/24 1438)  Temp Source: Temporal (08/14/24 2031)  Respirations: 16 (08/15/24 1438)  Weight - Scale: 104 kg (230 lb 6.1 oz) (08/15/24 0957)  SpO2: 95 % (08/15/24 1438)  Exam:   Physical Exam  Vitals and nursing note reviewed.   Constitutional:       General: He is not in acute distress.     Appearance: He is well-developed.   HENT:      Head: Normocephalic and atraumatic.   Eyes:      Conjunctiva/sclera: Conjunctivae normal.      Pupils: Pupils are equal, round, and reactive to light.   Cardiovascular:      Rate and Rhythm: Normal rate and regular rhythm.      Heart sounds: No murmur heard.  Pulmonary:      Effort: Pulmonary effort is normal. No respiratory distress.      Breath sounds: No rales.      Comments: Scattered end expiratory wheeze  Abdominal:      General: Abdomen is flat. Bowel sounds are normal. There is no distension.      Palpations: Abdomen is soft.      Tenderness: There is no abdominal tenderness. There is no guarding or rebound.   Musculoskeletal:         General: No swelling. Normal range of motion.      Cervical back: Normal range of motion.   Skin:     General: Skin is warm and dry.      Capillary Refill: Capillary refill takes less than 2 seconds.    Neurological:      General: No focal deficit present.      Mental Status: He is alert. Mental status is at baseline.   Psychiatric:         Mood and Affect: Mood normal.          Discussion with Family: Updated  (wife) at bedside.    Discharge instructions/Information to patient and family:   See after visit summary for information provided to patient and family.      Provisions for Follow-Up Care:  See after visit summary for information related to follow-up care and any pertinent home health orders.      Mobility at time of Discharge:   Basic Mobility Inpatient Raw Score: 23  JH-HLM Goal: 7: Walk 25 feet or more  JH-HLM Achieved: 7: Walk 25 feet or more  HLM Goal achieved. Continue to encourage appropriate mobility.     Disposition:   Home    Planned Readmission: None     Discharge Statement:  I spent 40 minutes discharging the patient. This time was spent on the day of discharge. I had direct contact with the patient on the day of discharge. Greater than 50% of the total time was spent examining patient, answering all patient questions, arranging and discussing plan of care with patient as well as directly providing post-discharge instructions.  Additional time then spent on discharge activities.    Discharge Medications:  See after visit summary for reconciled discharge medications provided to patient and/or family.      **Please Note: This note may have been constructed using a voice recognition system**

## 2024-08-15 NOTE — ASSESSMENT & PLAN NOTE
"Lab Results   Component Value Date    HGBA1C 7.0 (H) 01/09/2024       No results for input(s): \"POCGLU\" in the last 72 hours.    Blood Sugar Average: Last 72 hrs:    Cf., serum glucose 166 mg/dL (08/14/2024, 9:13 PM).    Continue carbohydrate consistent diet, POC glucose before every meal and nightly, and lispro insulin sliding scale before every meal and nightly.  Hold off patient's home-scheduled glipizide 25 mg-metformin 500 mg tablet, 2 tablets p.o. daily, while patient remains in hospital, given the potential for this combination medication to cause hypoglycemia/dyspepsia.  "
Asymptomatic on alprazolam 0.25 mg p.o. daily as needed for anxiety at home.  Patient will continue with this medication while in [facility] ([facility]).  
Asymptomatic on home-scheduled Flomax 0.4 mg p.o. every morning with no need for Stanton catheter as patient maintains brisk urine output on observation date 08/14/2024.  Hence, patient will continue with this medication while patient remains in [facility] ([facility]).  
Asymptomatic on rosuvastatin 5 mg p.o. nightly at home.  Patient will receive pravastatin 40 mg p.o. nightly in [facility] ([facility]).  
Asymptomatic with no suicidal ideation on observation date 08/14/2024.  Continue patient's home dose scheduled duloxetine 60 mg p.o. daily while patient remains in [facility] ([facility]).  
Continue Flomax  
Continue home alprazolam  
Continue rosuvastatin   
Continue synthroid  
Lab Results   Component Value Date    HGBA1C 7.3 (H) 08/14/2024       Recent Labs     08/15/24  0716 08/15/24  0920 08/15/24  1132 08/15/24  1605   POCGLU 115 231* 142* 121       Blood Sugar Average: Last 72 hrs:  (P) 152.25  Continue home meds  
No acute symptoms  
Patient has no goiter, lid lag, or proptosis on exam.  Patient appears to be euthyroid on home-scheduled Synthroid 150 mcg p.o. daily with last screening TSH 0.671 uIU/mL (01/09/2024, 11:27 AM).  
Patient reports that he has suffered from a persistent cough for the past 4 weeks at home.  Patient reports that the cough is intermittently productive of clear/white phlegm and no hemoptysis; at other times, the cough is non-productive.  Patient reports that he is taken azithromycin (Z-Luis) x 2 with no clinical relief in his persistent cough for the past 4 weeks at home; in fact, patient complains that his chest hurts whenever he coughs bilaterally, and is non-pleuritic, non-traumatic, non-radiating, non-exertional, and non-positional.    Patient reports that he stood up from his chair and then started coughing earlier today (08/14/2024, 4 PM), and subsequently lost consciousness.  Patient does not recall, however, losing consciousness after standing up and starting to cough non-productively.    In the ER, patient had no complaints of cough at all.  In addition, patient was not orthostatic.      Labs in the ER were remarkable only for BUN/crt 26/1.24 (08/14/2024, 9:13 PM) and WBC 11.69, N70 L18 M10 E2 (08/14/2024, 9:13pm).      Patient was subsequently placed in OBSERVATION on the hospitalist service at [facility] ([facility]) on 08/14/2024 with the following diagnoses:    Cough syncope.   Acute dehydration with BUN:creatinine ratio > 20:1 (e.g., BUN 26, creatinine 1.24).   Acute leukocytosis with WBC 11.69.    To address #1, supportive care is recommended.  To address #2, patient received 1 liter of 0.9% NS @ 1000 mL/hr (08/14/2024, 9:00 PM).  I will check BUN:creatinine ratio in the 08/15/2024, 6:00 AM).  To address #3, patient awaits COVID testing and repeat WBC w/diff in the 08/15/2024, 6:00 AM.      
Presented with an episode of syncope in the setting of coughing fit   Patient has had 4 weeks of subacute/chronic cough at this point-see plan below  
Subacute cough was not preceded by clear-cut viral illness, although this remains a possibility for etiology.  He does have a history of asthma, but apparently albuterol nebulizer at home was not helping per his wife.  Discussed options with him and reviewed that common causes of subacute/chronic cough can be asthma and GERD.  They would like to trial Symbicort first rather than PPI.  They will follow-up outpatient with PCP to see with the responses to this  He does have several animals at home including dogs, turkeys, ducks, birds and his symptoms did seem to improve somewhat from being in the hospital, so we did discuss that environmental triggers may be playing a role in this, but not definitive.  
18

## 2024-08-15 NOTE — CASE MANAGEMENT
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 (HCC) 08/15/2024     BPH (benign prostatic hyperplasia) 08/15/2024     Transaminitis 2021     Hyperlipidemia, mixed 10/14/2020     Hypothyroidism due to Hashimoto's thyroiditis 10/14/2020     Anxiety 10/14/2020     BMI 32.0-32.9,adult 10/14/2020     Type 2 diabetes mellitus, without long-term current use of insulin (HCC) 10/14/2020     Numbness and tingling of both legs 10/14/2020     Graves disease 10/14/2020       LOS (days): 0  Geometric Mean LOS (GMLOS) (days):   Days to GMLOS:     OBJECTIVE:              Current admission status: Observation       Preferred Pharmacy:   [id number] - [place], [place] - [address] [address]  Phone: [phone] Fax: [phone]    [facility] [place], [place] - [address]  [address]  [address]  [facility] [address]  Phone: [phone] Fax: [phone]    Primary Care Provider: No primary care provider on file.    Primary Insurance: MEDICARE  Secondary Insurance: BLUE CROSS    ASSESSMENT:  Active Health Care Proxies    There are no active Health Care Proxies on file.       Advance Directives  Does patient have a Health Care POA?: Yes  Does patient have Advance Directives?: No  Was patient offered paperwork?: Yes  Primary Contact: wife Abraham         Readmission Root Cause  30 Day Readmission: No    Patient Information  Admitted from:: Home  Mental Status: Alert  During Assessment patient was accompanied by: Not accompanied during assessment  Assessment information provided by:: Patient  Primary  Caregiver: Self  Support Systems: Family members  County of Residence: [place]  What [facility] do you live in?: [place]  Home entry access options. Select all that apply.: Stairs  Number of steps to enter home.: 1  Do the steps have railings?: No  Type of Current Residence: [facility]  Living Arrangements: Lives w/ Spouse/significant other  Is patient a ?: Yes  Is patient active with [facility] ([facility])?: No    Activities of Daily Living Prior to Admission  Functional Status: Independent  Completes ADLs independently?: Yes  Ambulates independently?: Yes  Does patient use assisted devices?: No  Does patient currently own DME?: No  Does patient have a history of Outpatient Therapy (PT/OT)?: No  Does the patient have a history of Short-Term Rehab?: No  Does patient have a history of HHC?: No  Does patient currently have HHC?: No         Patient Information Continued  Income Source: Pension/[employer] (+SSI)  Does patient have prescription coverage?: Yes  Does patient receive dialysis treatments?: No  Does patient have a history of substance abuse?: No  Does patient have a history of Mental Health Diagnosis?: No    PHQ 2/9 Screening   Reviewed PHQ 2/9 Depression Screening Score?: No    Means of Transportation  Means of Transport to Appts:: Drives Self      Social Determinants of Health (SDOH)      Flowsheet Row Most Recent Value   Housing Stability    In the last 12 months, was there a time when you were not able to pay the mortgage or rent on time? N   In the past 12 months, how many times have you moved where you were living? 0   At any time in the past 12 months, were you homeless or living in a shelter (including now)? N   Transportation Needs    In the past 12 months, has lack of transportation kept you from medical appointments or from getting medications? no   In the past 12 months, has lack of transportation kept you from meetings, work, or from getting things needed for daily living? No   Food Insecurity     Within the past 12 months, you worried that your food would run out before you got the money to buy more. Never true   Within the past 12 months, the food you bought just didn't last and you didn't have money to get more. Never true   Utilities    In the past 12 months has the electric, gas, oil, or water company threatened to shut off services in your home? No            DISCHARGE DETAILS:    Discharge planning discussed with:: Patient at the bedside  Freedom of Choice: Yes  Comments - Freedom of Choice: FOC maintained in discussion regarding discharge planning. Patient is alert oriented and competent to make decisions. Introduced self and role, explained role of CM in arranging services such as DME, STR, HHC, and providing community resource information. Discussed current living situation and needs at discharge. Patient is IPTA. CM offered HHC, STR, DME, PCP, OP CM, community resources. Patient declined CM resources. Does not use DME. Pt is aware and encouraged to seek CM for any questions or concerns. CM continues to follow.  CM contacted family/caregiver?: No- see comments  Were Treatment Team discharge recommendations reviewed with patient/caregiver?: Yes  Did patient/caregiver verbalize understanding of patient care needs?: Yes  Were patient/caregiver advised of the risks associated with not following Treatment Team discharge recommendations?: Yes    Contacts  Patient Contacts: Abraham, wife  Relationship to Patient:: Family  Reason/Outcome: Emergency Contact    Requested Home Health Care         Is the patient interested in HHC at discharge?: No    DME Referral Provided  Referral made for DME?: No    Other Referral/Resources/Interventions Provided:  Interventions: Declines resources, None Indicated  Referral Comments: CM will continue to follow for needs.    Would you like to participate in our [facility] service program?  : No - Declined       Discharge Destination Plan:: Home  Transport at Discharge  : Family

## 2024-08-16 ENCOUNTER — NURSE TRIAGE (OUTPATIENT)
Dept: OTHER | Facility: OTHER | Age: 79
End: 2024-08-16

## 2024-08-16 RX ORDER — MOMETASONE FUROATE AND FORMOTEROL FUMARATE DIHYDRATE 100; 5 UG/1; UG/1
2 AEROSOL RESPIRATORY (INHALATION) 2 TIMES DAILY
Qty: 13 G | Refills: 0 | Status: SHIPPED | OUTPATIENT
Start: 2024-08-16

## 2024-08-16 NOTE — TELEPHONE ENCOUNTER
"Reason for Disposition   [1] Caller has URGENT medicine question about med that PCP or specialist prescribed AND [2] triager unable to answer question    Answer Assessment - Initial Assessment Questions  1. NAME of MEDICATION: \"What medicine are you calling about?\"      Symbicort  2. QUESTION: \"What is your question?\" (e.g., medication refill, side effect)      Requesting an alternative. Symbicort needs a prior auth  3. PRESCRIBING HCP: \"Who prescribed it?\" Reason: if prescribed by specialist, call should be referred to that group.      Dr. Ames    Protocols used: Medication Question Call-ADULT-AH    "

## 2024-09-03 ENCOUNTER — EVALUATION (OUTPATIENT)
Dept: PHYSICAL THERAPY | Facility: CLINIC | Age: 79
End: 2024-09-03
Payer: MEDICARE

## 2024-09-03 DIAGNOSIS — R27.0 ATAXIA: Primary | ICD-10-CM

## 2024-09-03 PROCEDURE — 97110 THERAPEUTIC EXERCISES: CPT | Performed by: PHYSICAL THERAPIST

## 2024-09-03 PROCEDURE — 97112 NEUROMUSCULAR REEDUCATION: CPT | Performed by: PHYSICAL THERAPIST

## 2024-09-03 PROCEDURE — 97161 PT EVAL LOW COMPLEX 20 MIN: CPT | Performed by: PHYSICAL THERAPIST

## 2024-09-03 NOTE — PROGRESS NOTES
PT Evaluation     Today's date: 24  Patient name: Wero Mccormack  : 1945  MRN: [id number]  Referring provider: Minor Cooper DO  Dx:   Encounter Diagnosis     ICD-10-CM    1. Ataxia  R27.0                       Assessment  Impairments: abnormal gait, abnormal or restricted ROM, abnormal movement, activity intolerance, impaired balance, impaired physical strength, lacks appropriate home exercise program and pain with function  Functional limitations: Difficulty with sit to stand transfers, unable to get on/off the floor, and fatigues quickly with activity.  Symptom irritability: low    Assessment details: Wero Mccormack is a 79 y.o. male referred with primary diagnosis of Ataxia  (primary encounter diagnosis) .  Patient presents with the following functional limitations:  Difficulty with sit to stand transfers, unable to get on/off the floor, and fatigues quickly with activity.  Patient has mild weakness in bilateral hips and significant balance deficits.  Scored a 35/56 on the Briseno Balance test.  Treatment to include: Manual therapy techniques, extremity/core strengthening, neuromuscular control exercises, balance/proprioception training as appropriate, instruction in a comprehensive HEP, and modalities as needed.  They will benefit from skilled PT services to address the above functional deficits and to decrease pain to promote a return to their premorbid level of function.    Understanding of Dx/Px/POC: good     Prognosis: good    Goals  STG (4 weeks)  1. Patient will demonstrate the ability to correct posture with minimal VC's  2. Patient will demonstrate 25% gains in Hip ROM in all deficient planes  3. Patient will report pain as a 4-5/10 at worst with all normal activities    LTG (8 weeks)  1. Patient will report pain as a 0-1/10 at worst with normal activities  2. Patient will demonstrate Hip ROM WNL to facilitate improved quality of gait  3. Patient will demonstrate Hip strength WNL to  "improve improve transfers, quality of gait, and ability to negotiate stairs.  4. Patient will be independent and compliant with a HEP in order to maintain gains made with skilled PT services.      Plan  Patient would benefit from: skilled physical therapy  Planned modality interventions: cryotherapy and thermotherapy: hydrocollator packs    Planned therapy interventions: joint mobilization, manual therapy, balance, neuromuscular re-education, patient education, strengthening, stretching, therapeutic activities, therapeutic exercise and home exercise program    Frequency: 2x week  Duration in weeks: 8  Plan of Care beginning date: 9/3/2024  Plan of Care expiration date: 10/29/2024  Treatment plan discussed with: patient      Subjective Evaluation    History of Present Illness  Mechanism of injury: Patient states he is difficulty bending over to pick things up off of the floor and can't bend to put his shoes and socks on.  Reports a recent bout of syncope with a fall and hit is head.  He also states his right knee will occasionally feel like it wants to \"give out,\" but it never does.  Denies LOB.  Patient Goals  Patient goals for therapy: increased strength and improved balance  Patient goal: To be able to zeus/doff socks independently and improve jocelyn. 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  Treatments  No previous or current treatments      Objective     Postural Observations  Seated posture: fair  Standing posture: fair      Neurological Testing     Sensation     Lumbar   Left   Diminished: light touch    Right   Diminished: light touch    Comments   Left light touch: dorsum foot  Right light touch: dorsum foot    Reflexes   Left   Patellar (L4): normal (2+)  Achilles (S1): " normal (2+)    Right   Patellar (L4): normal (2+)  Achilles (S1): normal (2+)    Active Range of Motion     Lumbar   Normal active range of motion    Strength/Myotome Testing     Left Hip   Planes of Motion   Flexion: 4+  Extension: 3-  Abduction: 4-    Right Hip   Planes of Motion   Flexion: 4+  Extension: 3  Abduction: 4-    Left Knee   Flexion: 5  Extension: 5    Right Knee   Flexion: 5  Extension: 5    Left Ankle/Foot   Dorsiflexion: 4    Right Ankle/Foot   Dorsiflexion: 4         Precautions: Depression, Anxiety, CAD, DM, Neuropathy, Myopathy  Access Code [email or web address]   POC expires Unit limit Auth  expiration date PT/OT + Visit Limit?   10/29/24 NA 12/31 BOMN                 Visit/Unit Tracking  AUTH Status:  Date 9/3              Authorized Used 1               Remaining                  QR     Manuals 9/3                                                                Neuro Re-Ed             Pt education Fall risk, balance, proprioception.            Foam FT EO             Foam FA EC             Balance beam tandem walking             Balance beam sidesepping             Hurdles F/L                          Ther Ex             Nustep with UE             Matrix Quads             Matrix HS             Standing hip ABD and Ext jocelyn Instructed            Supine LTR             Supine DKC with ball             Seated LB stretch                          Ther Activity             STS no UE Instructed            Step-ups F/L             Gait Training                                       Modalities

## 2024-09-03 NOTE — LETTER
September 3, 2024    Minor Cooper DO   [address]  [facility] [address]    Patient: Wero Mccormack   YOB: 1945   Date of Visit: 9/3/2024     Encounter Diagnosis     ICD-10-CM    1. Ataxia  R27.0           Dear Dr. Cooper:    Thank you for your recent referral of Wero Mccormack. Please review the attached evaluation summary from Wero's recent visit.     Please verify that you agree with the plan of care by signing the attached order.     If you have any questions or concerns, please do not hesitate to call.     I sincerely appreciate the opportunity to share in the care of one of your patients and hope to have another opportunity to work with you in the near future.       Sincerely,    Javon Quinteros, PT      Referring Provider:      I certify that I have read the below Plan of Care and certify the need for these services furnished under this plan of treatment while under my care.                    Minor Cooper DO  [address]  [facility] [address]  Via Fax: [phone]          PT Evaluation     Today's date: 24  Patient name: Wero Mccormack  : 1945  MRN: [id number]  Referring provider: Minor Cooper DO  Dx:   Encounter Diagnosis     ICD-10-CM    1. Ataxia  R27.0                       Assessment  Impairments: abnormal gait, abnormal or restricted ROM, abnormal movement, activity intolerance, impaired balance, impaired physical strength, lacks appropriate home exercise program and pain with function  Functional limitations: Difficulty with sit to stand transfers, unable to get on/off the floor, and fatigues quickly with activity.  Symptom irritability: low    Assessment details: Wero Mccormack is a 79 y.o. male referred with primary diagnosis of Ataxia  (primary encounter diagnosis) .  Patient presents with the following functional limitations:  Difficulty with sit to stand transfers, unable to get on/off the floor, and fatigues quickly with activity.  Patient has mild weakness  in bilateral hips and significant balance deficits.  Scored a 35/56 on the Briseno Balance test.  Treatment to include: Manual therapy techniques, extremity/core strengthening, neuromuscular control exercises, balance/proprioception training as appropriate, instruction in a comprehensive HEP, and modalities as needed.  They will benefit from skilled PT services to address the above functional deficits and to decrease pain to promote a return to their premorbid level of function.    Understanding of Dx/Px/POC: good     Prognosis: good    Goals  STG (4 weeks)  1. Patient will demonstrate the ability to correct posture with minimal VC's  2. Patient will demonstrate 25% gains in Hip ROM in all deficient planes  3. Patient will report pain as a 4-5/10 at worst with all normal activities    LTG (8 weeks)  1. Patient will report pain as a 0-1/10 at worst with normal activities  2. Patient will demonstrate Hip ROM WNL to facilitate improved quality of gait  3. Patient will demonstrate Hip strength WNL to improve improve transfers, quality of gait, and ability to negotiate stairs.  4. Patient will be independent and compliant with a HEP in order to maintain gains made with skilled PT services.      Plan  Patient would benefit from: skilled physical therapy  Planned modality interventions: cryotherapy and thermotherapy: hydrocollator packs    Planned therapy interventions: joint mobilization, manual therapy, balance, neuromuscular re-education, patient education, strengthening, stretching, therapeutic activities, therapeutic exercise and home exercise program    Frequency: 2x week  Duration in weeks: 8  Plan of Care beginning date: 9/3/2024  Plan of Care expiration date: 10/29/2024  Treatment plan discussed with: patient      Subjective Evaluation    History of Present Illness  Mechanism of injury: Patient states he is difficulty bending over to pick things up off of the floor and can't bend to put his shoes and socks on.   "Reports a recent bout of syncope with a fall and hit is head.  He also states his right knee will occasionally feel like it wants to \"give out,\" but it never does.  Denies LOB.  Patient Goals  Patient goals for therapy: increased strength and improved balance  Patient goal: To be able to zeus/doff socks independently and improve jocelyn. 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  Treatments  No previous or current treatments      Objective     Postural Observations  Seated posture: fair  Standing posture: fair      Neurological Testing     Sensation     Lumbar   Left   Diminished: light touch    Right   Diminished: light touch    Comments   Left light touch: dorsum foot  Right light touch: dorsum foot    Reflexes   Left   Patellar (L4): normal (2+)  Achilles (S1): normal (2+)    Right   Patellar (L4): normal (2+)  Achilles (S1): normal (2+)    Active Range of Motion     Lumbar   Normal active range of motion    Strength/Myotome Testing     Left Hip   Planes of Motion   Flexion: 4+  Extension: 3-  Abduction: 4-    Right Hip   Planes of Motion   Flexion: 4+  Extension: 3  Abduction: 4-    Left Knee   Flexion: 5  Extension: 5    Right Knee   Flexion: 5  Extension: 5    Left Ankle/Foot   Dorsiflexion: 4    Right Ankle/Foot   Dorsiflexion: 4         Precautions: Depression, Anxiety, CAD, DM, Neuropathy, Myopathy  Access Code [email or web address]   POC expires Unit limit Auth  expiration date PT/OT + Visit Limit?   10/29/24 NA 12/31 BOMN                 Visit/Unit Tracking  AUTH Status:  Date 9/3              Authorized Used 1               Remaining                  QR     Manuals 9/3                                                                Neuro Re-Ed             Pt education Fall risk, " balance, proprioception.            Foam FT EO             Foam FA EC             Balance beam tandem walking             Balance beam sidesepping             Hurdles F/L                          Ther Ex             Nustep with UE             Matrix Quads             Matrix HS             Standing hip ABD and Ext jocelyn Instructed            Supine LTR             Supine DKC with ball             Seated LB stretch                          Ther Activity             STS no UE Instructed            Step-ups F/L             Gait Training                                       Modalities

## 2024-09-05 ENCOUNTER — OFFICE VISIT (OUTPATIENT)
Dept: PHYSICAL THERAPY | Facility: CLINIC | Age: 79
End: 2024-09-05
Payer: MEDICARE

## 2024-09-05 DIAGNOSIS — R27.0 ATAXIA: Primary | ICD-10-CM

## 2024-09-05 PROCEDURE — 97112 NEUROMUSCULAR REEDUCATION: CPT

## 2024-09-05 PROCEDURE — 97110 THERAPEUTIC EXERCISES: CPT

## 2024-09-05 NOTE — PROGRESS NOTES
Daily Note     Today's date: 2024  Patient name: Wero Mccormack  : 1945  MRN: [id number]  Referring provider: Minor Cooper DO  Dx:   Encounter Diagnosis     ICD-10-CM    1. Ataxia  R27.0                      Subjective: Pt reports feeling sore after doing exercises, states he was able to complete. No new instances of syncope or near falls since visit.      Objective: See treatment diary below      Assessment: Tolerated treatment well. Initiated stretching/strengthening focusing on flexibility of lower back and strength of hip/knee musculature.  Initiated balance training today, pt remains challenged on condition of static tandem stance on firm ground, as well as tandem walking on compliant surface.  PT will continue to implement and progress interventions as appropriate.  Patient demonstrated fatigue post treatment and would benefit from continued PT      Plan: Continue per plan of care.      Precautions: Depression, Anxiety, CAD, DM, Neuropathy, Myopathy  Access Code [email or web address]   POC expires Unit limit Auth  expiration date PT/OT + Visit Limit?   10/29/24 NA  BOMN                 Visit/Unit Tracking  AUTH Status:  Date 9/3 9/5             Authorized Used 1 2              Remaining                  QR     Manuals 9/3 9/5                                                               Neuro Re-Ed             Pt education Fall risk, balance, proprioception.            Foam FT EO             Foam FA EC  3x30''             Balance beam tandem walking  4 laps           Balance beam sidesepping  4 laps           Hurdles F/L  NV           Tandem stance  3x30''             Ther Ex             Nustep with UE  10' L1           Matrix Quads  2x10 40#           Matrix HS  2x10 50#           Standing hip ABD and Ext jocelyn Instructed            Supine LTR  10''x10           Supine DKC with ball  3x30''           Seated LB stretch  3x30''                        Ther Activity             STS no UE Instructed             Step-ups F/L  NV           Gait Training                                       Modalities

## 2024-09-09 NOTE — PROGRESS NOTES
"Daily Note     Today's date: 2024  Patient name: Wero Mccormack  : 1945  MRN: [id number]  Referring provider: Minor Cooper DO  Dx:   Encounter Diagnosis     ICD-10-CM    1. Ataxia  R27.0                      Subjective: Patient states he is terrible today because his back and knee went out on him on Saturday.  Didn't have any pain or soreness after last PT session..      Objective: See treatment diary below      Assessment: Tolerated treatment fair. Patient demonstrated fatigue post treatment and would benefit from continued PT.  Patient requested to increase resistance on Nustep and performed more repetitions on Matrix HS than asked to do.  Multiple LOB with dynamic balance exercises.  Reports feeling a gentle stretch with LTR and seated lower back stretch, but no complaints of back pain.        Plan: Continue per plan of care.  Progress treatment as tolerated.       Precautions: Depression, Anxiety, CAD, DM, Neuropathy, Myopathy  Access Code [email or web address]   POC expires Unit limit Auth  expiration date PT/OT + Visit Limit?   10/29/24 NA  BOMN                 Visit/Unit Tracking  AUTH Status:  Date 9/3 9/5 9/10            Authorized Used 1 2 3             Remaining                  QR     Manuals 9/3 9/5 9/10                                                              Neuro Re-Ed             Pt education Fall risk, balance, proprioception.            Foam FA EC  3x30'' FT EC x1'            Balance beam tandem walking  4 laps 4 laps          Balance beam sidesepping  4 laps 4 laps          Hurdles F/L  NV Fwd x 4 laps with pause          Tandem stance  3x30''             Ther Ex             Nustep with UE  10' L1 L4x10' L5         Matrix Quads  2x10 40# 2x10 40#          Matrix HS  2x10 50# 2x10 50#          Standing hip ABD and Ext jocelyn Instructed            Supine LTR  10''x10 10''x10          Supine DKC with ball  3x30'' 5\"x20          Seated LB stretch  3x30'' 3x30''                       Ther " Activity             STS no UE Instructed            Step-ups F/L  NV           Gait Training                                       Modalities

## 2024-09-10 ENCOUNTER — OFFICE VISIT (OUTPATIENT)
Dept: PHYSICAL THERAPY | Facility: CLINIC | Age: 79
End: 2024-09-10
Payer: MEDICARE

## 2024-09-10 DIAGNOSIS — R27.0 ATAXIA: Primary | ICD-10-CM

## 2024-09-10 PROCEDURE — 97112 NEUROMUSCULAR REEDUCATION: CPT | Performed by: PHYSICAL THERAPIST

## 2024-09-10 PROCEDURE — 97110 THERAPEUTIC EXERCISES: CPT | Performed by: PHYSICAL THERAPIST

## 2024-09-12 ENCOUNTER — OFFICE VISIT (OUTPATIENT)
Dept: PHYSICAL THERAPY | Facility: CLINIC | Age: 79
End: 2024-09-12
Payer: MEDICARE

## 2024-09-12 DIAGNOSIS — R27.0 ATAXIA: Primary | ICD-10-CM

## 2024-09-12 PROCEDURE — 97110 THERAPEUTIC EXERCISES: CPT | Performed by: PHYSICAL THERAPIST

## 2024-09-12 PROCEDURE — 97112 NEUROMUSCULAR REEDUCATION: CPT | Performed by: PHYSICAL THERAPIST

## 2024-09-12 NOTE — PROGRESS NOTES
"Daily Note     Today's date: 2024  Patient name: Wero Mccormack  : 1945  MRN: [id number]  Referring provider: Minor Cooper DO  Dx:   Encounter Diagnosis     ICD-10-CM    1. Ataxia  R27.0                      Subjective: Patient states his right knee is still a little sore.  Feels like his strength, balance, and endurance have been improving.      Objective: See treatment diary below      Assessment: Tolerated treatment well. Patient demonstrated fatigue post treatment and would benefit from continued PT.  Patient increased repetitions with Matrix quads and HS.  Also requested increased resistance with Nustep.  Added Multidirectional ball stretch for lower back      Plan: Continue per plan of care.  Progress treatment as tolerated.       Precautions: Depression, Anxiety, CAD, DM, Neuropathy, Myopathy  Access Code [email or web address]   POC expires Unit limit Auth  expiration date PT/OT + Visit Limit?   10/29/24 NA  BOMN                 Visit/Unit Tracking  AUTH Status:  Date 9/3 9/5 9/10 9/12           Authorized Used 1 2 3 4            Remaining                  QR     Manuals 9/3 9/5 9/10 9/12                                                             Neuro Re-Ed             Pt education Fall risk, balance, proprioception.            Foam FA EC  3x30'' FT EC x1' FT EC x1'           Balance beam tandem walking  4 laps 4 laps 4 laps         Balance beam sidesepping  4 laps 4 laps 4 laps         Hurdles F/L  NV Fwd x 4 laps with pause Fwd x 4 laps with pause         Tandem stance  3x30''             Ther Ex             Nustep with UE  10' L1 L4x10' L6x10'         Matrix Quads  2x10 40# 2x10 40# 3x10 40#         Matrix HS  2x10 50# 2x10 50# 3x10 50#         Standing hip ABD and Ext jocelyn Instructed            Supine LTR  10''x10 10''x10          Supine DKC with ball  3x30'' 5\"x20          Seated LB stretch  3x30'' 3x30''          Seated ball multidirectional stretch     3x30\" ea         Ther Activity          "    STS no UE Instructed            Step-ups F/L  NV           Gait Training                                       Modalities

## 2024-09-14 PROBLEM — R05.2 SUBACUTE COUGH: Status: RESOLVED | Noted: 2024-08-15 | Resolved: 2024-09-14

## 2024-09-17 ENCOUNTER — OFFICE VISIT (OUTPATIENT)
Dept: PHYSICAL THERAPY | Facility: CLINIC | Age: 79
End: 2024-09-17
Payer: MEDICARE

## 2024-09-17 DIAGNOSIS — R27.0 ATAXIA: Primary | ICD-10-CM

## 2024-09-17 PROCEDURE — 97110 THERAPEUTIC EXERCISES: CPT | Performed by: PHYSICAL THERAPIST

## 2024-09-17 PROCEDURE — 97112 NEUROMUSCULAR REEDUCATION: CPT | Performed by: PHYSICAL THERAPIST

## 2024-09-17 NOTE — PROGRESS NOTES
"Daily Note     Today's date: 2024  Patient name: Wero Mccormack  : 1945  MRN: [id number]  Referring provider: Minor Cooper DO  Dx:   Encounter Diagnosis     ICD-10-CM    1. Ataxia  R27.0                      Subjective: Patient states his legs felt good after last session.  One day he was able to get his sock and shoe on by himself.  Today he reports feeling \"wobbly.\"      Objective: See treatment diary below      Assessment: Tolerated treatment well. Patient demonstrated fatigue post treatment and would benefit from continued PT.   Patient very fatigued after session.  Some difficulty with dynamic standing balance.  Progressing well.      Plan: Continue per plan of care.  Progress treatment as tolerated.       Precautions: Depression, Anxiety, CAD, DM, Neuropathy, Myopathy  Access Code [email or web address]   POC expires Unit limit Auth  expiration date PT/OT + Visit Limit?   10/29/24 NA  BOMN                 Visit/Unit Tracking  AUTH Status:  Date 9/3 9/5 9/10 9/12 9/17          Authorized Used 1 2 3 4 5           Remaining                  QR     Manuals 9/3 9/5 9/10 9/12 9/17                                                            Neuro Re-Ed             Pt education Fall risk, balance, proprioception.            Foam FA EC  3x30'' FT EC x1' FT EC x1'           Balance beam tandem walking  4 laps 4 laps 4 laps 4 laps        Balance beam sidesepping  4 laps 4 laps 4 laps 4 laps        Hurdles F/L  NV Fwd x 4 laps with pause Fwd x 4 laps with pause Fwd x 4 laps with pause        Tandem stance  3x30''             Ther Ex             Nustep seat 10 with UE  10' L1 L4x10' L6x10' L6x10'        Matrix Quads  2x10 40# 2x10 40# 3x10 40# 3x10 40#        Matrix HS  2x10 50# 2x10 50# 3x10 50# 3x10 50#        Standing hip ABD and Ext jocelyn Instructed            Supine LTR  10''x10 10''x10          Supine DKC with ball  3x30'' 5\"x20          Seated LB stretch  3x30'' 3x30''          Seated ball multidirectional " "stretch     3x30\" ea 3x30\" ea        Ther Activity             STS no UE Instructed            Step-ups F/L  NV   6\" 2x10 ea jocelyn        Gait Training                                       Modalities                                                  "

## 2024-09-19 ENCOUNTER — OFFICE VISIT (OUTPATIENT)
Dept: PHYSICAL THERAPY | Facility: CLINIC | Age: 79
End: 2024-09-19
Payer: MEDICARE

## 2024-09-19 DIAGNOSIS — R27.0 ATAXIA: Primary | ICD-10-CM

## 2024-09-19 PROCEDURE — 97530 THERAPEUTIC ACTIVITIES: CPT | Performed by: PHYSICAL THERAPIST

## 2024-09-19 PROCEDURE — 97112 NEUROMUSCULAR REEDUCATION: CPT | Performed by: PHYSICAL THERAPIST

## 2024-09-19 PROCEDURE — 97110 THERAPEUTIC EXERCISES: CPT | Performed by: PHYSICAL THERAPIST

## 2024-09-19 NOTE — PROGRESS NOTES
"Daily Note     Today's date: 2024  Patient name: Wero Mccormack  : 1945  MRN: [id number]  Referring provider: Minor Cooper DO  Dx:   Encounter Diagnosis     ICD-10-CM    1. Ataxia  R27.0                      Subjective: \"I'm hanging in there.\"      Objective: See treatment diary below      Assessment: Tolerated treatment well. Patient demonstrated fatigue post treatment and would benefit from continued PT.  Performed forward hurdles today without UE assistance and had one LOB with min Assist to recover.  Initiated STS without UE and patient was very tired after 8 reps.      Plan: Continue per plan of care.  Progress treatment as tolerated.       Precautions: Depression, Anxiety, CAD, DM, Neuropathy, Myopathy  Access Code [email or web address]   POC expires Unit limit Auth  expiration date PT/OT + Visit Limit?   10/29/24 NA  BOMN                 Visit/Unit Tracking  AUTH Status:  Date 9/3 9/5 9/10 9/12 9/17 9/19         Authorized Used 1 2 3 4 5 6          Remaining                  QR     Manuals 9/3 9/5 9/10 9/12 9/17 9/19                                                           Neuro Re-Ed             Pt education Fall risk, balance, proprioception.            Foam FA EC  3x30'' FT EC x1' FT EC x1'           Balance beam tandem walking  4 laps 4 laps 4 laps 4 laps 4 laps       Balance beam sidesepping  4 laps 4 laps 4 laps 4 laps 4 laps       Hurdles F/L  NV Fwd x 4 laps with pause Fwd x 4 laps with pause Fwd x 4 laps with pause Fwd 4 laps no UE.  Cl sup       Tandem stance  3x30''             Ther Ex             Nustep seat 10 with UE  10' L1 L4x10' L6x10' L6x10' L6x10'       Matrix Quads  2x10 40# 2x10 40# 3x10 40# 3x10 40# 3x10 40#       Matrix HS  2x10 50# 2x10 50# 3x10 50# 3x10 50# 3x10 50#       Standing hip ABD and Ext jocelyn Instructed            Supine LTR  10''x10 10''x10          Supine DKC with ball  3x30'' 5\"x20          Seated LB stretch  3x30'' 3x30''          Seated ball multidirectional " "stretch     3x30\" ea 3x30\" ea 3x30\" ea       Ther Activity             STS no UE Instructed     x8       Step-ups F/L  NV   6\" 2x10 ea jocelyn 6\" 2x10 ea jocelyn       Gait Training                                       Modalities                                                    "

## 2024-09-24 ENCOUNTER — OFFICE VISIT (OUTPATIENT)
Dept: PHYSICAL THERAPY | Facility: CLINIC | Age: 79
End: 2024-09-24
Payer: MEDICARE

## 2024-09-24 DIAGNOSIS — R27.0 ATAXIA: Primary | ICD-10-CM

## 2024-09-24 PROCEDURE — 97112 NEUROMUSCULAR REEDUCATION: CPT

## 2024-09-24 PROCEDURE — 97530 THERAPEUTIC ACTIVITIES: CPT

## 2024-09-24 PROCEDURE — 97110 THERAPEUTIC EXERCISES: CPT

## 2024-09-24 NOTE — PROGRESS NOTES
Daily Note     Today's date: 2024  Patient name: Wero Mccormack  : 1945  MRN: [id number]  Referring provider: Minor Cooper DO  Dx:   Encounter Diagnosis     ICD-10-CM    1. Ataxia  R27.0           Start Time: 1100  Stop Time: 1141  Total time in clinic (min): 41 minutes    Subjective: pt noted that on Saturday he fell forward while reaching for his birds that did not get in its pen.  He noted that he did not get checked out by a dr at this time.      Objective: See treatment diary below      Assessment:  continued with treatment with focus on balance and LE strengthening to improve gait. At this time minimal Tolerated treatment well. Patient exhibited good technique with therapeutic exercises and would benefit from continued PT  S/P treatment session, fatigued in LE's therefore would like ot hold the foam beam exercises.     Plan: Continue per plan of care.      Precautions: Depression, Anxiety, CAD, DM, Neuropathy, Myopathy  Access Code [email or web address]   POC expires Unit limit Auth  expiration date PT/OT + Visit Limit?   10/29/24 NA  BOMN                 Visit/Unit Tracking  AUTH Status:  Date 9/3 9/5 9/10 9/12 9/17 9/19 9/24        Authorized Used 1 2 3 4 5 6 7         Remaining                  QR     Manuals 9/3 9/5 9/10 9/12 9/17 9/19 9/24                                                          Neuro Re-Ed             Pt education Fall risk, balance, proprioception.            Foam FA EC  3x30'' FT EC x1' FT EC x1'           Balance beam tandem walking  4 laps 4 laps 4 laps 4 laps 4 laps Declined today       Balance beam sidesepping  4 laps 4 laps 4 laps 4 laps 4 laps Declined today       Hurdles F/L  NV Fwd x 4 laps with pause Fwd x 4 laps with pause Fwd x 4 laps with pause Fwd 4 laps no UE.  Cl sup Fwd and lateral no UE cl sup  Add foam      Tandem stance  3x30''             Ther Ex             Nustep seat 10 with UE  10' L1 L4x10' L6x10' L6x10' L6x10' L6 10 min       Matrix Quads  2x10 40#  "2x10 40# 3x10 40# 3x10 40# 3x10 40# 3x 10 30#      Matrix HS  2x10 50# 2x10 50# 3x10 50# 3x10 50# 3x10 50# 3x 10 50#      Standing hip ABD and Ext jocelyn Instructed            Supine LTR  10''x10 10''x10          Supine DKC with ball  3x30'' 5\"x20          Seated LB stretch  3x30'' 3x30''          Seated ball multidirectional stretch     3x30\" ea 3x30\" ea 3x30\" ea 3x 30\"                                 Ther Activity             STS no UE Instructed     x8 10x       Step-ups F/L  NV   6\" 2x10 ea jocelyn 6\" 2x10 ea jocelyn 6\" 2x 10 u/l UE        Gait Training                                       Modalities                                                      "

## 2024-09-25 NOTE — PROGRESS NOTES
"Daily Note     Today's date: 2024  Patient name: Wero Mccormack  : 1945  MRN: [id number]  Referring provider: Minor Cooper DO  Dx:   Encounter Diagnosis     ICD-10-CM    1. Ataxia  R27.0                      Subjective: ***      Objective: See treatment diary below      Assessment: Tolerated treatment {Tolerated treatment :7627730270}. Patient {assessment:1210031393}      Plan: {PLAN:}     Precautions: Depression, Anxiety, CAD, DM, Neuropathy, Myopathy  Access Code [email or web address]   POC expires Unit limit Auth  expiration date PT/OT + Visit Limit?   10/29/24 NA  BOMN                 Visit/Unit Tracking  AUTH Status:  Date 9/3 9/5 9/10 9/12 9/17 9/19 9/24        Authorized Used 1 2 3 4 5 6 7         Remaining                  QR     Manuals 9/3 9/5 9/10 9/12 9/17 9/19 9/24                                                          Neuro Re-Ed             Pt education Fall risk, balance, proprioception.            Foam FA EC  3x30'' FT EC x1' FT EC x1'           Balance beam tandem walking  4 laps 4 laps 4 laps 4 laps 4 laps Declined today       Balance beam sidesepping  4 laps 4 laps 4 laps 4 laps 4 laps Declined today       Hurdles F/L  NV Fwd x 4 laps with pause Fwd x 4 laps with pause Fwd x 4 laps with pause Fwd 4 laps no UE.  Cl sup Fwd and lateral no UE cl sup  Add foam      Tandem stance  3x30''             Ther Ex             Nustep seat 10 with UE  10' L1 L4x10' L6x10' L6x10' L6x10' L6 10 min       Matrix Quads  2x10 40# 2x10 40# 3x10 40# 3x10 40# 3x10 40# 3x 10 30#      Matrix HS  2x10 50# 2x10 50# 3x10 50# 3x10 50# 3x10 50# 3x 10 50#      Standing hip ABD and Ext jocelyn Instructed            Supine LTR  10''x10 10''x10          Supine DKC with ball  3x30'' 5\"x20          Seated LB stretch  3x30'' 3x30''          Seated ball multidirectional stretch     3x30\" ea 3x30\" ea 3x30\" ea 3x 30\"                                 Ther Activity             STS no UE Instructed     x8 10x     " "  Step-ups F/L  NV   6\" 2x10 ea jocelyn 6\" 2x10 ea jocelyn 6\" 2x 10 u/l UE        Gait Training                                       Modalities                                                        "

## 2024-09-26 ENCOUNTER — APPOINTMENT (OUTPATIENT)
Dept: PHYSICAL THERAPY | Facility: CLINIC | Age: 79
End: 2024-09-26
Payer: MEDICARE

## 2024-09-26 DIAGNOSIS — R27.0 ATAXIA: Primary | ICD-10-CM

## 2024-10-01 ENCOUNTER — EVALUATION (OUTPATIENT)
Dept: PHYSICAL THERAPY | Facility: CLINIC | Age: 79
End: 2024-10-01
Payer: MEDICARE

## 2024-10-01 DIAGNOSIS — R27.0 ATAXIA: Primary | ICD-10-CM

## 2024-10-01 PROCEDURE — 97110 THERAPEUTIC EXERCISES: CPT | Performed by: PHYSICAL THERAPIST

## 2024-10-01 NOTE — PROGRESS NOTES
PT Re-Evaluation     Today's date: 10/01/24  Patient name: Wero Mccormack  : 1945  MRN: [id number]  Referring provider: Minor Cooper DO  Dx:   Encounter Diagnosis     ICD-10-CM    1. Ataxia  R27.0                       Assessment  Impairments: abnormal gait, abnormal or restricted ROM, abnormal movement, activity intolerance, impaired balance, impaired physical strength, lacks appropriate home exercise program and pain with function  Functional limitations: Unable to get on/off the floor.  Symptom irritability: low    Assessment details: Patient reports feeling 50% improved since starting PT services.  Overall, he has noticed significant improvement in his bilateral LE strength, balance, and endurance.  He can now walk to/from his barn 3 times without fatigue, whereas, he previously had to rest after only walking out to his barn once.  He did have one fall at home when bending over to try and  a duckling.  Other than that, he thinks his balance is greatly improved.  Briseno Balance score improved from 34/56 to a 46/56.  Bilateral LE strength has improved significantly.  Patient will benefit from continuation of PT services in order to further improve his balance and endurance.  Understanding of Dx/Px/POC: good     Prognosis: good    Goals  STG (4 weeks)  1. Patient will demonstrate the ability to correct posture with minimal VC's - met  2. Patient will demonstrate 25% gains in Hip ROM in all deficient planes- met  3. Patient will report pain as a 4-5/10 at worst with all normal activities- not met    LTG (8 weeks)  1. Patient will report pain as a 0-1/10 at worst with normal activities - not met  2. Patient will demonstrate Hip ROM WNL to facilitate improved quality of gait - partially met  3. Patient will demonstrate Hip strength WNL to improve improve transfers, quality of gait, and ability to negotiate stairs.- met  4. Patient will be independent and compliant with a HEP in order to maintain  gains made with skilled PT services.- partially met      Plan  Patient would benefit from: skilled physical therapy  Planned modality interventions: cryotherapy and thermotherapy: hydrocollator packs    Planned therapy interventions: joint mobilization, manual therapy, balance, neuromuscular re-education, patient education, strengthening, stretching, therapeutic activities, therapeutic exercise and home exercise program    Frequency: 2x week  Duration in weeks: 4  Plan of Care beginning date: 9/3/2024  Plan of Care expiration date: 10/29/2024  Treatment plan discussed with: patient        Subjective Evaluation    History of Present Illness  Mechanism of injury: Patient reports like his leg strength and quality of gait are much improved.  He did have one fall when bending over to reach for baby ducks, but overall, balance is much better.  He can now make  3 trips out to his barn and back without resting.  Still gets some pain in his hips with bending over, which he states is a chronic issue..  Reports doing HEP.  Would like to continue PT services to further work on strengthening, balance, and endurance.  Patient Goals  Patient goals for therapy: increased strength and improved balance  Patient goal: To be able to zeus/doff socks independently and improve jocelyn. 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  Treatments  No previous or current treatments        Objective     Postural Observations  Seated posture: fair  Standing posture: fair      Neurological Testing     Sensation     Lumbar   Left   Diminished: light touch    Right   Diminished: light touch    Comments   Left light touch: dorsum foot  Right light touch: dorsum foot    Reflexes   Left   Patellar (L4): normal  "(2+)  Achilles (S1): normal (2+)    Right   Patellar (L4): normal (2+)  Achilles (S1): normal (2+)    Active Range of Motion     Lumbar   Normal active range of motion    Strength/Myotome Testing     Left Hip   Planes of Motion   Flexion: 4+  Extension: 4+  Abduction: 4+    Right Hip   Planes of Motion   Flexion: 4+  Extension: 4+  Abduction: 4+    Left Knee   Flexion: 5  Extension: 5    Right Knee   Flexion: 5  Extension: 5    Left Ankle/Foot   Dorsiflexion: 4    Right Ankle/Foot   Dorsiflexion: 4         Precautions: Depression, Anxiety, CAD, DM, Neuropathy, Myopathy  Access Code [email or web address]   POC expires Unit limit Auth  expiration date PT/OT + Visit Limit?   10/29/24 NA 12/31 BOMN                 Visit/Unit Tracking  AUTH Status:  Date 9/3 9/5 9/10 9/12 9/17 9/19 9/24 10/1       Authorized Used 1 2 3 4 5 6 7 8        Remaining                  QR     Manuals 9/3 9/5 9/10 9/12 9/17 9/19 9/24 10/1                                            RE performed        JF     Neuro Re-Ed             Pt education Fall risk, balance, proprioception.            Foam FA EC  3x30'' FT EC x1' FT EC x1'           Balance beam tandem walking  4 laps 4 laps 4 laps 4 laps 4 laps Declined today       Balance beam sidesepping  4 laps 4 laps 4 laps 4 laps 4 laps Declined today       Hurdles F/L  NV Fwd x 4 laps with pause Fwd x 4 laps with pause Fwd x 4 laps with pause Fwd 4 laps no UE.  Cl sup Fwd and lateral no UE cl sup  Add foam      Tandem stance  3x30''             Ther Ex             Nustep seat 10 with UE  10' L1 L4x10' L6x10' L6x10' L6x10' L6 10 min       Matrix Quads  2x10 40# 2x10 40# 3x10 40# 3x10 40# 3x10 40# 3x 10 30#      Matrix HS  2x10 50# 2x10 50# 3x10 50# 3x10 50# 3x10 50# 3x 10 50#      Standing hip ABD and Ext jocelyn Instructed            Supine LTR  10''x10 10''x10          Supine DKC with ball  3x30'' 5\"x20          Seated LB stretch  3x30'' 3x30''          Seated ball multidirectional stretch     3x30\" ea 3x30\" ea " "3x30\" ea 3x 30\"                                 Ther Activity             STS no UE Instructed     x8 10x       Step-ups F/L  NV   6\" 2x10 ea jocelyn 6\" 2x10 ea jocelyn 6\" 2x 10 u/l UE        Gait Training                                       Modalities                                            "

## 2024-10-02 NOTE — PROGRESS NOTES
Daily Note     Today's date: 10/2/2024  Patient name: Wero Mccormack  : 1945  MRN: [id number]  Referring provider: Minor Cooper DO  Dx:   Encounter Diagnosis     ICD-10-CM    1. Ataxia  R27.0                      Subjective: Patient reports feeling fatigued today.      Objective: See treatment diary below      Assessment: Tolerated treatment well. Patient demonstrated fatigue post treatment and would benefit from continued PT.  Patient apprehensive with forward foam balance and touches bar for assistance.  Very tired with sit to stand exercise.      Plan: Continue per plan of care.  Progress treatment as tolerated.       Precautions: Depression, Anxiety, CAD, DM, Neuropathy, Myopathy  Access Code [email or web address]   POC expires Unit limit Auth  expiration date PT/OT + Visit Limit?   10/29/24 NA  BOMN                 Visit/Unit Tracking  AUTH Status:  Date 9/3 9/5 9/10 9/12 9/17 9/19 9/24 10/1 10/3      Authorized Used 1 2 3 4 5 6 7 8 9       Remaining                  QR     Manuals 9/3 9/5 9/10 9/12 9/17 9/19 9/24 10/1 10/3                                           Re performed        JF     Neuro Re-Ed             Pt education Fall risk, balance, proprioception.            Foam FA EC  3x30'' FT EC x1' FT EC x1'           Balance beam tandem walking  4 laps 4 laps 4 laps 4 laps 4 laps Declined today   4 laps    Balance beam sidesepping  4 laps 4 laps 4 laps 4 laps 4 laps Declined today   4 laps    Hurdles F/L  NV Fwd x 4 laps with pause Fwd x 4 laps with pause Fwd x 4 laps with pause Fwd 4 laps no UE.  Cl sup Fwd and lateral no UE cl sup  Add foam  Fwd 4 laps no UE.  Cl sup    Tandem stance  3x30''             Ther Ex             Nustep seat 10 with UE  10' L1 L4x10' L6x10' L6x10' L6x10' L6 10 min   L6 10 min     Matrix Quads  2x10 40# 2x10 40# 3x10 40# 3x10 40# 3x10 40# 3x 10 30#  3x 10 30#    Matrix HS  2x10 50# 2x10 50# 3x10 50# 3x10 50# 3x10 50# 3x 10 50#  3x 10 50#    Standing hip ABD and Ext jocelyn  "Instructed            Supine LTR  10''x10 10''x10          Supine DKC with ball  3x30'' 5\"x20          Seated LB stretch  3x30'' 3x30''          Seated ball multidirectional stretch     3x30\" ea 3x30\" ea 3x30\" ea 3x 30\"   3x 30\"                               Ther Activity             STS no UE Instructed     x8 10x   x10    Step-ups F/L  NV   6\" 2x10 ea jocelyn 6\" 2x10 ea jocelyn 6\" 2x 10 u/l UE    8\" 2x10 single UE    Gait Training                                       Modalities                                            "

## 2024-10-03 ENCOUNTER — OFFICE VISIT (OUTPATIENT)
Dept: PHYSICAL THERAPY | Facility: CLINIC | Age: 79
End: 2024-10-03
Payer: MEDICARE

## 2024-10-03 DIAGNOSIS — R27.0 ATAXIA: Primary | ICD-10-CM

## 2024-10-03 PROCEDURE — 97110 THERAPEUTIC EXERCISES: CPT | Performed by: PHYSICAL THERAPIST

## 2024-10-03 PROCEDURE — 97530 THERAPEUTIC ACTIVITIES: CPT | Performed by: PHYSICAL THERAPIST

## 2024-10-07 NOTE — PROGRESS NOTES
"Daily Note     Today's date: 10/7/2024  Patient name: Wero Mccormack  : 1945  MRN: [id number]  Referring provider: Minor Cooper DO  Dx:   Encounter Diagnosis     ICD-10-CM    1. Ataxia  R27.0                      Subjective: Patient states his back \"isn't feeling great today.\"      Objective: See treatment diary below      Assessment: Tolerated treatment fair. Patient demonstrated fatigue post treatment and would benefit from continued PT.  Back felt much better after treatment today.  Felt \"stretched out.\"      Plan: Continue per plan of care.  Progress treatment as tolerated.       Precautions: Depression, Anxiety, CAD, DM, Neuropathy, Myopathy  Access Code [email or web address]   POC expires Unit limit Auth  expiration date PT/OT + Visit Limit?   10/29/24 NA  BOMN                 Visit/Unit Tracking  AUTH Status:  Date 9/3 9/5 9/10 9/12 9/17 9/19 9/24 10/1 10/3 10/8     Authorized Used 1 2 3 4 5 6 7 8 9 10      Remaining                  QR     Manuals 9/3 9/5 9/10 9/12 9/17 9/19 9/24 10/1 10/3 10/8                                          Re performed        JF     Neuro Re-Ed             Pt education Fall risk, balance, proprioception.            Foam FA EC  3x30'' FT EC x1' FT EC x1'           Balance beam tandem walking  4 laps 4 laps 4 laps 4 laps 4 laps Declined today   4 laps    Balance beam sidesepping  4 laps 4 laps 4 laps 4 laps 4 laps Declined today   4 laps    Hurdles F/L  NV Fwd x 4 laps with pause Fwd x 4 laps with pause Fwd x 4 laps with pause Fwd 4 laps no UE.  Cl sup Fwd and lateral no UE cl sup  Add foam  Fwd 4 laps no UE.  Cl sup    Tandem stance  3x30''             Ther Ex             Nustep seat 10 with UE  10' L1 L4x10' L6x10' L6x10' L6x10' L6 10 min   L6 10 min  L6 10 min    Matrix Quads  2x10 40# 2x10 40# 3x10 40# 3x10 40# 3x10 40# 3x 10 30#  3x 10 30# 4x 10 40#   Matrix HS  2x10 50# 2x10 50# 3x10 50# 3x10 50# 3x10 50# 3x 10 50#  3x 10 50# 3x 10 50#   Standing hip ABD and Ext jocelyn " "Instructed            Supine HL T-ball Press          5\"x30   Supine LTR  10''x10 10''x10       10''x10   Supine DKC with ball  3x30'' 5\"x20       5\"x20   Seated LB stretch  3x30'' 3x30''          Seated ball multidirectional stretch     3x30\" ea 3x30\" ea 3x30\" ea 3x 30\"   3x 30\"  3x 30\"                              Ther Activity             Bosu squats          X10 jocelyn UE and CG Assist   STS no UE Instructed     x8 10x   x10    Step-ups F/L  NV   6\" 2x10 ea jocelyn 6\" 2x10 ea jocelyn 6\" 2x 10 u/l UE    8\" 2x10 single UE    Gait Training                                       Modalities                                              " Home

## 2024-10-08 ENCOUNTER — OFFICE VISIT (OUTPATIENT)
Dept: PHYSICAL THERAPY | Facility: CLINIC | Age: 79
End: 2024-10-08
Payer: MEDICARE

## 2024-10-08 DIAGNOSIS — R27.0 ATAXIA: Primary | ICD-10-CM

## 2024-10-08 PROCEDURE — 97110 THERAPEUTIC EXERCISES: CPT | Performed by: PHYSICAL THERAPIST

## 2024-10-08 PROCEDURE — 97530 THERAPEUTIC ACTIVITIES: CPT | Performed by: PHYSICAL THERAPIST

## 2024-10-10 ENCOUNTER — OFFICE VISIT (OUTPATIENT)
Dept: PHYSICAL THERAPY | Facility: CLINIC | Age: 79
End: 2024-10-10
Payer: MEDICARE

## 2024-10-10 DIAGNOSIS — R27.0 ATAXIA: Primary | ICD-10-CM

## 2024-10-10 PROCEDURE — 97112 NEUROMUSCULAR REEDUCATION: CPT

## 2024-10-10 PROCEDURE — 97110 THERAPEUTIC EXERCISES: CPT

## 2024-10-10 PROCEDURE — 97530 THERAPEUTIC ACTIVITIES: CPT

## 2024-10-10 NOTE — PROGRESS NOTES
Daily Note     Today's date: 10/10/2024  Patient name: Wero Mccormack  : 1945  MRN: [id number]  Referring provider: Minor Cooper DO  Dx:   Encounter Diagnosis     ICD-10-CM    1. Ataxia  R27.0           Start Time: 1028  Stop Time: 1107  Total time in clinic (min): 39 minutes    Subjective: Pt note that he has been feeling pretty good and his back has been feeling better an has no pain upon arrival for treatment session.       Objective: See treatment diary below      Assessment:  Continued with treatment session at this time was able to make progressions with no LOB noted. Overall progressing well. Tolerated treatment well. Patient demonstrated fatigue post treatment, exhibited good technique with therapeutic exercises, and would benefit from continued PT  S/p treatment noted having some increase in fagitue but no other changes.     Plan: Continue per plan of care.      Precautions: Depression, Anxiety, CAD, DM, Neuropathy, Myopathy  Access Code [email or web address]   POC expires Unit limit Auth  expiration date PT/OT + Visit Limit?   10/29/24 NA  BOMN                 Visit/Unit Tracking  AUTH Status:  Date 9/3 9/5 9/10 9/12 9/17 9/19 9/24 10/1 10/3 10/8 10/10    Authorized Used 1 2 3 4 5 6 7 8 9 10 11     Remaining                  QR     Manuals 10/10 9/5 9/10 9/12 9/17 9/19 9/24 10/1 10/3 10/8                                          Re performed        JF     Neuro Re-Ed             Pt education             Foam FA EC  3x30'' FT EC x1' FT EC x1'           Balance beam tandem walking 4 laps  4 laps 4 laps 4 laps 4 laps 4 laps Declined today   4 laps    Balance beam sidesepping 4 laps  4 laps 4 laps 4 laps 4 laps 4 laps Declined today   4 laps    Hurdles F/L Lateral and fwd with green and blue foam 4 laps NV Fwd x 4 laps with pause Fwd x 4 laps with pause Fwd x 4 laps with pause Fwd 4 laps no UE.  Cl sup Fwd and lateral no UE cl sup  Add foam  Fwd 4 laps no UE.  Cl sup    Tandem stance  3x30''            "  Rockerboard  30x  A/P                          Ther Ex                          Nustep seat 10 with UE L6 10 min iwht UE  10' L1 L4x10' L6x10' L6x10' L6x10' L6 10 min   L6 10 min  L6 10 min    Matrix Quads 3x 10 40# 2x10 40# 2x10 40# 3x10 40# 3x10 40# 3x10 40# 3x 10 30#  3x 10 30# 4x 10 40#   Matrix HS 3x 10 50# 2x10 50# 2x10 50# 3x10 50# 3x10 50# 3x10 50# 3x 10 50#  3x 10 50# 3x 10 50#   Standing hip ABD and Ext jocelyn             Supine HL T-ball Press Declined          5\"x30   Supine LTR Decined  10''x10 10''x10       10''x10   Supine DKC with ball Declined  3x30'' 5\"x20       5\"x20   Seated LB stretch  3x30'' 3x30''          Seated ball multidirectional stretch  Declined.    3x30\" ea 3x30\" ea 3x30\" ea 3x 30\"   3x 30\"  3x 30\"                              Ther Activity             Bosu squats          X10 jocelyn UE and CG Assist   STS no UE      x8 10x   x10    Step-ups F/L 6\" lateral 2x 10  NV   6\" 2x10 ea jocelyn 6\" 2x10 ea jocelyn 6\" 2x 10 u/l UE    8\" 2x10 single UE    Gait Training                                       Modalities                                                 1 on 1 time for 38 minutes on 10/10/24   "

## 2024-10-14 NOTE — PROGRESS NOTES
Daily Note     Today's date: 10/14/2024  Patient name: Wero Mccormack  : 1945  MRN: [id number]  Referring provider: Minor Cooper DO  Dx:   Encounter Diagnosis     ICD-10-CM    1. Ataxia  R27.0                      Subjective: No new complaints.  Back has not been bothering him.  He has also noticed improvements in his bilateral LE strength and endurance.  Patient states he would like to DC PT services next session.      Objective: See treatment diary below      Assessment: Tolerated treatment well. Patient demonstrated fatigue post treatment and would benefit from continued PT.  Still apprehensive and uses single UE for LT while performing dynamic balance exercises.      Plan:  DC PT services next session per patient request.     Precautions: Depression, Anxiety, CAD, DM, Neuropathy, Myopathy  Access Code [email or web address]   POC expires Unit limit Auth  expiration date PT/OT + Visit Limit?   10/29/24 NA  BOMN                 Visit/Unit Tracking  AUTH Status:  Date 9/3 9/5 9/10 9/12 9/17 9/19 9/24 10/1 10/3 10/8 10/10 10/15   Authorized Used 1 2 3 4 5 6 7 8 9 10 11 12    Remaining                  QR     Manuals 10/10 10/15 9/10 9/12 9/17 9/19 9/24 10/1 10/3 10/8                                          Re performed        JF     Neuro Re-Ed             Pt education             Foam FA EC   FT EC x1' FT EC x1'           Balance beam tandem walking 4 laps  4 laps 4 laps 4 laps 4 laps 4 laps Declined today   4 laps    Balance beam sidesepping 4 laps  4 laps 4 laps 4 laps 4 laps 4 laps Declined today   4 laps    Hurdles F/L Lateral and fwd with green and blue foam 4 laps Lateral and fwd with green and blue foam 4 laps Fwd x 4 laps with pause Fwd x 4 laps with pause Fwd x 4 laps with pause Fwd 4 laps no UE.  Cl sup Fwd and lateral no UE cl sup  Add foam  Fwd 4 laps no UE.  Cl sup    Tandem stance               Rockerboard  30x  A/P                          Ther Ex                          Nustep seat 10 with UE  "L6 10 min iwht UE  L6 10 min iwht UE  L4x10' L6x10' L6x10' L6x10' L6 10 min   L6 10 min  L6 10 min    Matrix Quads 3x 10 40# 3x 10 40# 2x10 40# 3x10 40# 3x10 40# 3x10 40# 3x 10 30#  3x 10 30# 4x 10 40#   Matrix HS 3x 10 50# 3x 10 50# 2x10 50# 3x10 50# 3x10 50# 3x10 50# 3x 10 50#  3x 10 50# 3x 10 50#   Standing hip ABD and Ext jocelyn  NV           Supine HL T-ball Press Declined          5\"x30   Supine LTR Decined   10''x10       10''x10   Supine DKC with ball Declined   5\"x20       5\"x20   Seated LB stretch   3x30''          Seated ball multidirectional stretch  Declined.    3x30\" ea 3x30\" ea 3x30\" ea 3x 30\"   3x 30\"  3x 30\"    T-band sidestepping at bar  NV                        Ther Activity             Bosu squats          X10 jocelyn UE and CG Assist   STS no UE  NV    x8 10x   x10    Step-ups F/L 6\" lateral 2x 10  6\" lateral 2x 10  6\" 2x10 ea  6\" 2x10 ea jocelyn 6\" 2x10 ea jocelyn 6\" 2x 10 u/l UE    8\" 2x10 single UE    Gait Training                                       Modalities                                                  "

## 2024-10-15 ENCOUNTER — OFFICE VISIT (OUTPATIENT)
Dept: PHYSICAL THERAPY | Facility: CLINIC | Age: 79
End: 2024-10-15
Payer: MEDICARE

## 2024-10-15 DIAGNOSIS — R27.0 ATAXIA: Primary | ICD-10-CM

## 2024-10-15 PROCEDURE — 97110 THERAPEUTIC EXERCISES: CPT | Performed by: PHYSICAL THERAPIST

## 2024-10-15 PROCEDURE — 97530 THERAPEUTIC ACTIVITIES: CPT | Performed by: PHYSICAL THERAPIST

## 2024-10-15 PROCEDURE — 97112 NEUROMUSCULAR REEDUCATION: CPT | Performed by: PHYSICAL THERAPIST

## 2024-10-17 ENCOUNTER — OFFICE VISIT (OUTPATIENT)
Dept: PHYSICAL THERAPY | Facility: CLINIC | Age: 79
End: 2024-10-17
Payer: MEDICARE

## 2024-10-17 DIAGNOSIS — R27.0 ATAXIA: Primary | ICD-10-CM

## 2024-10-17 PROCEDURE — 97110 THERAPEUTIC EXERCISES: CPT

## 2024-10-17 PROCEDURE — 97112 NEUROMUSCULAR REEDUCATION: CPT

## 2024-10-17 NOTE — PROGRESS NOTES
Daily Note     Today's date: 10/17/2024  Patient name: Wero Mccormack  : 1945  MRN: [id number]  Referring provider: Minor Cooper DO  Dx:   Encounter Diagnosis     ICD-10-CM    1. Ataxia  R27.0           Start Time: 1020  Stop Time: 1048  Total time in clinic (min): 28 minutes    Subjective: Pt denies any changes upon arrival.       Objective: See treatment diary below      Assessment: Tolerated treatment well. Patient demonstrated fatigue post treatment and would benefit from continued PT.  Still apprehensive and uses single UE for LT while performing dynamic balance exercises. Pt was most challenged by step ups due to fatigue.       Plan:  DC PT services next session per patient request.     Precautions: Depression, Anxiety, CAD, DM, Neuropathy, Myopathy  Access Code [email or web address]   POC expires Unit limit Auth  expiration date PT/OT + Visit Limit?   10/29/24 NA  BOMN                 Visit/Unit Tracking  AUTH Status:  Date 9/3 9/5 9/10 9/12 9/17 9/19 9/24 10/1 10/3 10/8 10/10 10/15 10/17   Authorized Used 1 2 3 4 5 6 7 8 9 10 11 12 13    Remaining                   QR     Manuals 10/10 10/15 9/10 9/12 9/17 9/19 9/24 10/1 10/3 10/8 10/17                                             Re performed        JF      Neuro Re-Ed              Pt education              Foam FA EC   FT EC x1' FT EC x1'            Balance beam tandem walking 4 laps  4 laps 4 laps 4 laps 4 laps 4 laps Declined today   4 laps     Balance beam sidesepping 4 laps  4 laps 4 laps 4 laps 4 laps 4 laps Declined today   4 laps     Hurdles F/L Lateral and fwd with green and blue foam 4 laps Lateral and fwd with green and blue foam 4 laps Fwd x 4 laps with pause Fwd x 4 laps with pause Fwd x 4 laps with pause Fwd 4 laps no UE.  Cl sup Fwd and lateral no UE cl sup  Add foam  Fwd 4 laps no UE.  Cl sup     Tandem stance                Rockerboard  30x  A/P                            Ther Ex                            Nustep seat 10 with UE L6 10  "min iwht UE  L6 10 min iwht UE  L4x10' L6x10' L6x10' L6x10' L6 10 min   L6 10 min  L6 10 min  L6 10 min    Matrix Quads 3x 10 40# 3x 10 40# 2x10 40# 3x10 40# 3x10 40# 3x10 40# 3x 10 30#  3x 10 30# 4x 10 40# 4x 10 40#   Matrix HS 3x 10 50# 3x 10 50# 2x10 50# 3x10 50# 3x10 50# 3x10 50# 3x 10 50#  3x 10 50# 3x 10 50# 3x 10 50#   Standing hip ABD and Ext jocelyn  NV            Supine HL T-ball Press Declined          5\"x30 5\"x30   Supine LTR Decined   10''x10       10''x10 10''x10   Supine DKC with ball Declined   5\"x20       5\"x20 5\"x20   Seated LB stretch   3x30''           Seated ball multidirectional stretch  Declined.    3x30\" ea 3x30\" ea 3x30\" ea 3x 30\"   3x 30\"  3x 30\"  3x 30\"    T-band sidestepping at bar  NV                          Ther Activity              Bosu squats          X10 jocelyn UE and CG Assist X10 jocelyn UE and CG Assist   STS no UE  NV    x8 10x   x10     Step-ups F/L 6\" lateral 2x 10  6\" lateral 2x 10  6\" 2x10 ea  6\" 2x10 ea jocelyn 6\" 2x10 ea jocelyn 6\" 2x 10 u/l UE    8\" 2x10 single UE  8\" 2x10 single UE   Gait Training                                          Modalities                                                     "

## 2024-10-22 ENCOUNTER — APPOINTMENT (OUTPATIENT)
Dept: PHYSICAL THERAPY | Facility: CLINIC | Age: 79
End: 2024-10-22
Payer: MEDICARE

## 2024-10-24 ENCOUNTER — APPOINTMENT (OUTPATIENT)
Dept: PHYSICAL THERAPY | Facility: CLINIC | Age: 79
End: 2024-10-24
Payer: MEDICARE

## 2024-11-05 VITALS
OXYGEN SATURATION: 97 % | HEART RATE: 77 BPM | DIASTOLIC BLOOD PRESSURE: 82 MMHG | HEIGHT: 71 IN | WEIGHT: 230 LBS | SYSTOLIC BLOOD PRESSURE: 140 MMHG | BODY MASS INDEX: 32.2 KG/M2

## 2024-11-05 DIAGNOSIS — R29.898 WEAKNESS OF BOTH HIPS: ICD-10-CM

## 2024-11-05 DIAGNOSIS — M16.0 ARTHRITIS OF BOTH HIPS: Primary | ICD-10-CM

## 2024-11-05 DIAGNOSIS — M53.3 SI (SACROILIAC) JOINT DYSFUNCTION: ICD-10-CM

## 2024-11-05 DIAGNOSIS — S39.012A LUMBAR STRAIN, INITIAL ENCOUNTER: ICD-10-CM

## 2024-11-05 DIAGNOSIS — M62.9 HAMSTRING TIGHTNESS OF BOTH LOWER EXTREMITIES: ICD-10-CM

## 2024-11-05 DIAGNOSIS — M47.816 LUMBAR FACET ARTHROPATHY: ICD-10-CM

## 2024-11-05 DIAGNOSIS — M51.362 DEGENERATION OF INTERVERTEBRAL DISC OF LUMBAR REGION WITH DISCOGENIC BACK PAIN AND LOWER EXTREMITY PAIN: ICD-10-CM

## 2024-11-05 DIAGNOSIS — M54.16 RADICULOPATHY, LUMBAR REGION: ICD-10-CM

## 2024-11-05 PROCEDURE — 99203 OFFICE O/P NEW LOW 30 MIN: CPT | Performed by: FAMILY MEDICINE

## 2024-11-05 RX ORDER — DULOXETIN HYDROCHLORIDE 30 MG/1
CAPSULE, DELAYED RELEASE ORAL
COMMUNITY
Start: 2024-10-18

## 2024-11-05 RX ORDER — CARBIDOPA AND LEVODOPA 25; 100 MG/1; MG/1
TABLET ORAL
COMMUNITY

## 2024-11-05 RX ORDER — KETOCONAZOLE 20 MG/G
CREAM TOPICAL
COMMUNITY
Start: 2024-10-20

## 2024-11-05 NOTE — PATIENT INSTRUCTIONS
AQUA THERAPY  and home exercises    Electrical stimulation unit  - 30 minutes  - twice daily    [employer] [facility] in [place], [place]    Over the counter vitamins:    - Turmeric vitamin at least 1000 mg daily    - Glucosamine-chondrointin 2-3 times daily

## 2024-11-05 NOTE — PROGRESS NOTES
Assessment/Plan:  Assessment & Plan   Diagnoses and all orders for this visit:    Arthritis of both hips  -     Ambulatory Referral to Physical Therapy; Future    Radiculopathy, lumbar region  -     Ambulatory Referral to Physical Therapy; Future    Degeneration of intervertebral disc of lumbar region with discogenic back pain and lower extremity pain  -     Ambulatory Referral to Physical Therapy; Future    Lumbar facet arthropathy  -     Ambulatory Referral to Physical Therapy; Future    SI (sacroiliac) joint dysfunction  -     Ambulatory Referral to Physical Therapy; Future    Lumbar strain, initial encounter  -     Ambulatory Referral to Physical Therapy; Future    Weakness of both hips  -     Ambulatory Referral to Physical Therapy; Future    Hamstring tightness of both lower extremities  -     Ambulatory Referral to Physical Therapy; Future    Other orders  -     carbidopa-levodopa (SINEMET)  mg per tablet; TAKE 1 TABLET 2 TIMES DAILY BY MOUTH  -     ketoconazole (NIZORAL) 2 % cream; APPLY 2 GRAM TOPICALLY EVERY DAY FOR FUNGUS (Patient not taking: Reported on 11/5/2024)  -     DULoxetine (CYMBALTA) 30 mg delayed release capsule; TAKE ONE (1) CAPSULE BY MOUTH DAILY        79-year-old male [employer]  with pain both hips more than 1 year duration.  Discussed with patient physical exam, imaging studies, impression, and plan.  CT scan noted for degenerative changes with joint space narrowing both hips.  There are multilevel degenerative changes lumbar spine with pronounced disc space narrowing L5-S1.  Imaging studies, clinical exam, and history suggest that he has symptoms from degenerative changes.  I advised patient that surgery not warranted at this time.  Offered patient steroid injection to which he agreed.  I discussed treatment regimen of supplements and aqua therapy.  I will refer him to aqua therapy which he is to start as soon as possible and do home exercises as directed.  He is to  start taking turmeric and glucosamine supplements.  He is noted to have lumbar paraspinal atrophy as well as atrophy of the abductors both hips and quadriceps both thighs, with lumbar spine limited range of motion, weakness both hips with flexion and abduction at 4/5, weakness both thighs with extension at 4/5.  He may benefit from electrical stimulation so I will refer him for home electrical stimulation unit to be used 30 minutes twice daily.  He will follow-up as needed.        Subjective:   Patient ID: Wero Mccormack is a 79 y.o. male.  Chief Complaint   Patient presents with    Left Hip - Pain    Right Hip - Pain        79-year-old male [employer]  presents for evaluation pain both hips more than 1 year duration.  He denies trauma or inciting event.  Pain described as gradual in onset, generalized to the hips, none radiating, worse with prolonged standing and ambulation, associated with weakness, and improved with resting.  He has a chronic history of neuropathy affecting both lower legs and feet.  He has been attending formal therapy to help with ataxia, but denies any change in symptoms of the hips.  He ambulates with a walking cane.  He takes Tylenol to help with symptoms.    Hip Pain  This is a chronic problem. The current episode started more than 1 year ago. The problem occurs daily. The problem has been gradually worsening. Associated symptoms include arthralgias, numbness and weakness. Pertinent negatives include no joint swelling. The symptoms are aggravated by standing and walking. He has tried rest, position changes and acetaminophen (Physical therapy) for the symptoms. The treatment provided mild relief.           The following portions of the patient's history were reviewed and updated as appropriate: He  has a past medical history of Anxiety, Coronary artery disease, Depression, Diabetes mellitus (HCC), Graves disease, Hyperlipidemia, Hypothyroidism, Myopathy, and Neuropathy.  He is  "allergic to penicillin g..    Review of Systems   Musculoskeletal:  Positive for arthralgias. Negative for joint swelling.   Neurological:  Positive for weakness and numbness.       Objective:  Vitals:    11/05/24 1002   BP: 140/82   Pulse: 77   SpO2: 97%   Weight: 104 kg (230 lb)   Height: 5' 11\" (1.803 m)        Right Knee Exam     Comments:  Quadricep atrophy      Left Knee Exam     Comments:  Quadricep atrophy      Right Hip Exam     Muscle Strength   Abduction: 4/5   Flexion: 4/5     Tests   STEFANY: negative    Comments:  Positive FADDIR  Hamstring tightness  Abductor atrophy      Left Hip Exam     Muscle Strength   Abduction: 4/5   Flexion: 4/5     Tests   STEFANY: negative    Comments:  Positive FADDIR  Hamstring tightness  Abductor atrophy      Back Exam     Tenderness   The patient is experiencing no tenderness.     Range of Motion   Extension:  abnormal   Flexion:  abnormal   Lateral bend right:  abnormal   Lateral bend left:  abnormal   Rotation right:  abnormal   Rotation left:  abnormal     Muscle Strength   Right Quadriceps:  4/5   Left Quadriceps:  4/5     Tests   Straight leg raise right: negative  Straight leg raise left: negative    Other   Gait: abnormal     Comments:  Lumbar spine  - Bilateral paraspinal atrophy            Physical Exam  Vitals and nursing note reviewed.   Constitutional:       Appearance: Normal appearance. He is well-developed. He is not ill-appearing or diaphoretic.   HENT:      Head: Normocephalic and atraumatic.      Right Ear: External ear normal.      Left Ear: External ear normal.   Eyes:      Conjunctiva/sclera: Conjunctivae normal.   Neck:      Trachea: No tracheal deviation.   Cardiovascular:      Rate and Rhythm: Normal rate.   Pulmonary:      Effort: Pulmonary effort is normal. No respiratory distress.   Abdominal:      General: There is no distension.   Musculoskeletal:         General: No tenderness.      Lumbar back: Negative right straight leg raise test and " negative left straight leg raise test.   Skin:     General: Skin is warm and dry.      Coloration: Skin is not jaundiced or pale.   Neurological:      Mental Status: He is alert and oriented to person, place, and time.   Psychiatric:         Mood and Affect: Mood normal.         Behavior: Behavior normal.         Thought Content: Thought content normal.         Judgment: Judgment normal.         I have personally reviewed pertinent films in PACS and my interpretation is  .  CT scan noted for degenerative changes with joint space narrowing both hips.  There are multilevel degenerative changes lumbar spine with pronounced disc space narrowing L5-S1.